# Patient Record
Sex: FEMALE | Race: WHITE | NOT HISPANIC OR LATINO | Employment: OTHER | URBAN - NONMETROPOLITAN AREA
[De-identification: names, ages, dates, MRNs, and addresses within clinical notes are randomized per-mention and may not be internally consistent; named-entity substitution may affect disease eponyms.]

---

## 2017-04-11 ENCOUNTER — OFFICE VISIT (OUTPATIENT)
Dept: NEUROSURGERY | Facility: CLINIC | Age: 67
End: 2017-04-11

## 2017-04-11 VITALS — HEIGHT: 64 IN | WEIGHT: 110 LBS | BODY MASS INDEX: 18.78 KG/M2

## 2017-04-11 DIAGNOSIS — M51.26 HERNIATION OF LUMBAR INTERVERTEBRAL DISC: Primary | ICD-10-CM

## 2017-04-11 PROCEDURE — 99203 OFFICE O/P NEW LOW 30 MIN: CPT | Performed by: NEUROLOGICAL SURGERY

## 2017-04-11 RX ORDER — ACETAMINOPHEN 500 MG
1000 TABLET ORAL EVERY 6 HOURS PRN
COMMUNITY
End: 2021-09-17 | Stop reason: HOSPADM

## 2017-04-11 RX ORDER — TRIAMTERENE AND HYDROCHLOROTHIAZIDE 37.5; 25 MG/1; MG/1
1 TABLET ORAL DAILY
COMMUNITY
End: 2021-09-17 | Stop reason: HOSPADM

## 2017-04-11 RX ORDER — OXYBUTYNIN CHLORIDE 5 MG/1
5 TABLET ORAL 3 TIMES DAILY
COMMUNITY

## 2017-04-11 RX ORDER — TRAMADOL HYDROCHLORIDE 50 MG/1
100 TABLET ORAL EVERY 6 HOURS PRN
Status: ON HOLD | COMMUNITY
End: 2021-09-17 | Stop reason: SDUPTHER

## 2017-04-11 RX ORDER — CITALOPRAM 20 MG/1
20 TABLET ORAL DAILY
COMMUNITY

## 2017-04-11 NOTE — PROGRESS NOTES
Subjective   Patient ID: Karyna Santiago is a 66 y.o. 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  It has become more constant in the past several months.  She has had intermittent back pain since age 15 when she was thrown from a horse.  Pain is aggravated by lifting, walking, driving, bending, or twisting.  This helped with both ice and heat.    Review of Radiographic Studies:   Lumbar MRI scan shows a herniated disc in the lateral recess at L2-3 on the left    The following portions of the patient's history were reviewed, updated as appropriate and approved: allergies, current medications, past family history, past medical history, past social history, past surgical history, review of systems and problem list.    Review of Systems   Constitutional: Positive for activity change, fatigue and unexpected weight change. Negative for appetite change, chills, diaphoresis and fever.   HENT: Positive for ear pain, postnasal drip, sinus pressure and sore throat. Negative for congestion, dental problem, drooling, ear discharge, facial swelling, hearing loss, mouth sores, nosebleeds, rhinorrhea, sneezing, tinnitus, trouble swallowing and voice change.    Eyes: Negative for photophobia, pain, discharge, redness, itching and visual disturbance.   Respiratory: Negative for apnea, cough, choking, chest tightness, shortness of breath, wheezing and stridor.    Cardiovascular: Positive for leg swelling. Negative for chest pain and palpitations.   Gastrointestinal: Positive for constipation and diarrhea. Negative for abdominal distention, abdominal pain, anal bleeding, blood in stool, nausea, rectal pain and vomiting.   Endocrine: Negative for cold intolerance, heat intolerance, polydipsia, polyphagia and polyuria.    Genitourinary: Positive for dyspareunia and urgency. Negative for decreased urine volume, difficulty urinating, dysuria, enuresis, flank pain, frequency, genital sores and hematuria.   Musculoskeletal: Positive for arthralgias, back pain, myalgias and neck pain. Negative for gait problem, joint swelling and neck stiffness.   Skin: Negative for color change, pallor, rash and wound.   Allergic/Immunologic: Positive for environmental allergies. Negative for food allergies and immunocompromised state.   Neurological: Negative for dizziness, tremors, seizures, syncope, facial asymmetry, speech difficulty, weakness, light-headedness, numbness and headaches.   Hematological: Negative for adenopathy. Bruises/bleeds easily.   Psychiatric/Behavioral: Positive for dysphoric mood. Negative for agitation, behavioral problems, confusion, decreased concentration, hallucinations, self-injury, sleep disturbance and suicidal ideas. The patient is not nervous/anxious and is not hyperactive.        Objective     NEUROLOGICAL EXAMINATION:      MENTAL STATUS:  Alert and oriented.  Speech intact.  Recent and remote memory intact.      CRANIAL NERVES:  Cranial nerve II:  Visual fields are full to confrontation.  Cranial nerves III, IV and VI:  PERRLADC.  Extraocular movements are intact.  Nystagmus is not present.  Cranial nerve V:  Facial sensation is intact to light touch.  Cranial nerve VII:  Muscles of facial expression reveal no asymmetry.  Cranial nerve VIII:  Hearing is intact to finger rub bilaterally.  Cranial nerves IX and X:  Palate elevates symmetrically.  Cranial nerve XI:  Shoulder shrug is intact.  Cranial nerve XII:  Tongue is midline without evidence of atrophy or fasciculation.    MUSCULOSKELETAL:  SLR negative. Salvador's test negative.    MOTOR:  Deltoid, biceps, triceps, and  strength intact.  No hand atrophy.  Hip flexion, knee extension, ankle dorsiflexion and plantar flexion intact. No tremor, spasticity,  ataxia, or dysmetria.    SENSATION:  Intact to touch upper and lower extremities.  Position sense intact.    REFLEXES:  DTR intact and symmetrical in upper and lower extremities.      Assessment   Lumbar disc herniation L2-3 left       Plan   Physical therapy, follow-up in 6 weeks.       Dany Latham MD

## 2021-09-14 ENCOUNTER — APPOINTMENT (OUTPATIENT)
Dept: MRI IMAGING | Facility: HOSPITAL | Age: 71
End: 2021-09-14

## 2021-09-14 ENCOUNTER — APPOINTMENT (OUTPATIENT)
Dept: CT IMAGING | Facility: HOSPITAL | Age: 71
End: 2021-09-14

## 2021-09-14 ENCOUNTER — HOSPITAL ENCOUNTER (INPATIENT)
Facility: HOSPITAL | Age: 71
LOS: 3 days | Discharge: NURSING, REHAB OR LONG-TERM CARE | End: 2021-09-17
Attending: FAMILY MEDICINE | Admitting: INTERNAL MEDICINE

## 2021-09-14 ENCOUNTER — APPOINTMENT (OUTPATIENT)
Dept: CARDIOLOGY | Facility: HOSPITAL | Age: 71
End: 2021-09-14

## 2021-09-14 DIAGNOSIS — R13.10 DYSPHAGIA, UNSPECIFIED TYPE: ICD-10-CM

## 2021-09-14 DIAGNOSIS — R47.1 DYSARTHRIA: ICD-10-CM

## 2021-09-14 DIAGNOSIS — R41.841 COGNITIVE COMMUNICATION DEFICIT: ICD-10-CM

## 2021-09-14 DIAGNOSIS — I63.9 CEREBROVASCULAR ACCIDENT (CVA), UNSPECIFIED MECHANISM (HCC): Primary | ICD-10-CM

## 2021-09-14 PROBLEM — E87.6 HYPOKALEMIA: Status: ACTIVE | Noted: 2021-09-14

## 2021-09-14 PROBLEM — Z72.0 TOBACCO ABUSE: Status: ACTIVE | Noted: 2021-09-14

## 2021-09-14 PROBLEM — I10 ESSENTIAL HYPERTENSION: Status: ACTIVE | Noted: 2021-09-14

## 2021-09-14 LAB
ALBUMIN SERPL-MCNC: 3 G/DL (ref 3.5–5.2)
ALBUMIN/GLOB SERPL: 1.3 G/DL
ALP SERPL-CCNC: 57 U/L (ref 39–117)
ALT SERPL W P-5'-P-CCNC: 7 U/L (ref 1–33)
ANION GAP SERPL CALCULATED.3IONS-SCNC: 9 MMOL/L (ref 5–15)
ASCENDING AORTA: 3.6 CM
AST SERPL-CCNC: 13 U/L (ref 1–32)
BACTERIA UR QL AUTO: ABNORMAL /HPF
BASOPHILS # BLD AUTO: 0.05 10*3/MM3 (ref 0–0.2)
BASOPHILS NFR BLD AUTO: 0.7 % (ref 0–1.5)
BH CV ECHO MEAS - AI DEC SLOPE: 148.3 CM/SEC^2
BH CV ECHO MEAS - AI MAX PG: 47 MMHG
BH CV ECHO MEAS - AI MAX VEL: 342.5 CM/SEC
BH CV ECHO MEAS - AI P1/2T: 676.3 MSEC
BH CV ECHO MEAS - AO MAX PG (FULL): 3.4 MMHG
BH CV ECHO MEAS - AO MAX PG: 8 MMHG
BH CV ECHO MEAS - AO MEAN PG (FULL): 1.4 MMHG
BH CV ECHO MEAS - AO MEAN PG: 3.7 MMHG
BH CV ECHO MEAS - AO ROOT AREA (BSA CORRECTED): 2
BH CV ECHO MEAS - AO ROOT AREA: 7.5 CM^2
BH CV ECHO MEAS - AO ROOT DIAM: 3.1 CM
BH CV ECHO MEAS - AO V2 MAX: 137 CM/SEC
BH CV ECHO MEAS - AO V2 MEAN: 87.9 CM/SEC
BH CV ECHO MEAS - AO V2 VTI: 26.5 CM
BH CV ECHO MEAS - ASC AORTA: 3.6 CM
BH CV ECHO MEAS - AVA(I,A): 2.5 CM^2
BH CV ECHO MEAS - AVA(I,D): 2.5 CM^2
BH CV ECHO MEAS - AVA(V,A): 2.4 CM^2
BH CV ECHO MEAS - AVA(V,D): 2.4 CM^2
BH CV ECHO MEAS - BSA(HAYCOCK): 1.6 M^2
BH CV ECHO MEAS - BSA: 1.5 M^2
BH CV ECHO MEAS - BZI_BMI: 22.1 KILOGRAMS/M^2
BH CV ECHO MEAS - BZI_METRIC_HEIGHT: 157.5 CM
BH CV ECHO MEAS - BZI_METRIC_WEIGHT: 54.9 KG
BH CV ECHO MEAS - EDV(CUBED): 80.5 ML
BH CV ECHO MEAS - EDV(MOD-SP2): 55.4 ML
BH CV ECHO MEAS - EDV(MOD-SP4): 65.7 ML
BH CV ECHO MEAS - EDV(TEICH): 83.9 ML
BH CV ECHO MEAS - EF(CUBED): 69.3 %
BH CV ECHO MEAS - EF(MOD-BP): 62 %
BH CV ECHO MEAS - EF(MOD-SP2): 63.4 %
BH CV ECHO MEAS - EF(MOD-SP4): 60.1 %
BH CV ECHO MEAS - EF(TEICH): 61.2 %
BH CV ECHO MEAS - ESV(CUBED): 24.7 ML
BH CV ECHO MEAS - ESV(MOD-SP2): 20.3 ML
BH CV ECHO MEAS - ESV(MOD-SP4): 26.2 ML
BH CV ECHO MEAS - ESV(TEICH): 32.5 ML
BH CV ECHO MEAS - FS: 32.6 %
BH CV ECHO MEAS - IVS/LVPW: 1.1
BH CV ECHO MEAS - IVSD: 0.94 CM
BH CV ECHO MEAS - LA DIMENSION: 3.4 CM
BH CV ECHO MEAS - LA/AO: 1.1
BH CV ECHO MEAS - LAD MAJOR: 4.2 CM
BH CV ECHO MEAS - LAT PEAK E' VEL: 7.9 CM/SEC
BH CV ECHO MEAS - LATERAL E/E' RATIO: 8
BH CV ECHO MEAS - LV DIASTOLIC VOL/BSA (35-75): 42.6 ML/M^2
BH CV ECHO MEAS - LV IVRT: 0.1 SEC
BH CV ECHO MEAS - LV MASS(C)D: 124.8 GRAMS
BH CV ECHO MEAS - LV MASS(C)DI: 80.8 GRAMS/M^2
BH CV ECHO MEAS - LV MAX PG: 4.6 MMHG
BH CV ECHO MEAS - LV MEAN PG: 2.2 MMHG
BH CV ECHO MEAS - LV SYSTOLIC VOL/BSA (12-30): 17 ML/M^2
BH CV ECHO MEAS - LV V1 MAX: 107.7 CM/SEC
BH CV ECHO MEAS - LV V1 MEAN: 67.8 CM/SEC
BH CV ECHO MEAS - LV V1 VTI: 21 CM
BH CV ECHO MEAS - LVIDD: 4.3 CM
BH CV ECHO MEAS - LVIDS: 2.9 CM
BH CV ECHO MEAS - LVLD AP2: 6.6 CM
BH CV ECHO MEAS - LVLD AP4: 6.8 CM
BH CV ECHO MEAS - LVLS AP2: 5.4 CM
BH CV ECHO MEAS - LVLS AP4: 5.5 CM
BH CV ECHO MEAS - LVOT AREA (M): 3.1 CM^2
BH CV ECHO MEAS - LVOT AREA: 3.1 CM^2
BH CV ECHO MEAS - LVOT DIAM: 2 CM
BH CV ECHO MEAS - LVPWD: 0.86 CM
BH CV ECHO MEAS - MED PEAK E' VEL: 7 CM/SEC
BH CV ECHO MEAS - MEDIAL E/E' RATIO: 9.1
BH CV ECHO MEAS - MV A MAX VEL: 87.8 CM/SEC
BH CV ECHO MEAS - MV DEC SLOPE: 372 CM/SEC^2
BH CV ECHO MEAS - MV DEC TIME: 0.21 SEC
BH CV ECHO MEAS - MV E MAX VEL: 63.4 CM/SEC
BH CV ECHO MEAS - MV E/A: 0.72
BH CV ECHO MEAS - MV MAX PG: 3.3 MMHG
BH CV ECHO MEAS - MV MEAN PG: 1.1 MMHG
BH CV ECHO MEAS - MV P1/2T MAX VEL: 73.8 CM/SEC
BH CV ECHO MEAS - MV P1/2T: 58.1 MSEC
BH CV ECHO MEAS - MV V2 MAX: 90.2 CM/SEC
BH CV ECHO MEAS - MV V2 MEAN: 46 CM/SEC
BH CV ECHO MEAS - MV V2 VTI: 24.1 CM
BH CV ECHO MEAS - MVA P1/2T LCG: 3 CM^2
BH CV ECHO MEAS - MVA(P1/2T): 3.8 CM^2
BH CV ECHO MEAS - MVA(VTI): 2.7 CM^2
BH CV ECHO MEAS - PA ACC TIME: 0.16 SEC
BH CV ECHO MEAS - PA MAX PG: 3.7 MMHG
BH CV ECHO MEAS - PA PR(ACCEL): 6.1 MMHG
BH CV ECHO MEAS - PA V2 MAX: 96.6 CM/SEC
BH CV ECHO MEAS - RAP SYSTOLE: 8 MMHG
BH CV ECHO MEAS - RVSP: 40 MMHG
BH CV ECHO MEAS - SI(AO): 128.8 ML/M^2
BH CV ECHO MEAS - SI(CUBED): 36.2 ML/M^2
BH CV ECHO MEAS - SI(LVOT): 42.3 ML/M^2
BH CV ECHO MEAS - SI(MOD-SP2): 22.7 ML/M^2
BH CV ECHO MEAS - SI(MOD-SP4): 25.6 ML/M^2
BH CV ECHO MEAS - SI(TEICH): 33.3 ML/M^2
BH CV ECHO MEAS - SV(AO): 198.8 ML
BH CV ECHO MEAS - SV(CUBED): 55.8 ML
BH CV ECHO MEAS - SV(LVOT): 65.2 ML
BH CV ECHO MEAS - SV(MOD-SP2): 35.1 ML
BH CV ECHO MEAS - SV(MOD-SP4): 39.5 ML
BH CV ECHO MEAS - SV(TEICH): 51.4 ML
BH CV ECHO MEAS - TAPSE (>1.6): 2 CM
BH CV ECHO MEAS - TR MAX PG: 32 MMHG
BH CV ECHO MEAS - TR MAX VEL: 282 CM/SEC
BH CV ECHO MEASUREMENTS AVERAGE E/E' RATIO: 8.51
BH CV VAS BP LEFT ARM: NORMAL MMHG
BH CV XLRA - RV BASE: 3.5 CM
BH CV XLRA - RV LENGTH: 5.7 CM
BH CV XLRA - RV MID: 2.4 CM
BH CV XLRA - TDI S': 18.2 CM/SEC
BILIRUB SERPL-MCNC: 0.6 MG/DL (ref 0–1.2)
BILIRUB UR QL STRIP: ABNORMAL
BUN SERPL-MCNC: 13 MG/DL (ref 8–23)
BUN/CREAT SERPL: 21 (ref 7–25)
CALCIUM SPEC-SCNC: 8.6 MG/DL (ref 8.6–10.5)
CHLORIDE SERPL-SCNC: 101 MMOL/L (ref 98–107)
CHOLEST SERPL-MCNC: 134 MG/DL (ref 0–200)
CLARITY UR: CLEAR
CO2 SERPL-SCNC: 27 MMOL/L (ref 22–29)
COLOR UR: ABNORMAL
CREAT SERPL-MCNC: 0.62 MG/DL (ref 0.57–1)
DEPRECATED RDW RBC AUTO: 50.9 FL (ref 37–54)
EOSINOPHIL # BLD AUTO: 0.06 10*3/MM3 (ref 0–0.4)
EOSINOPHIL NFR BLD AUTO: 0.9 % (ref 0.3–6.2)
ERYTHROCYTE [DISTWIDTH] IN BLOOD BY AUTOMATED COUNT: 14.7 % (ref 12.3–15.4)
FOLATE SERPL-MCNC: >20 NG/ML (ref 4.78–24.2)
GFR SERPL CREATININE-BSD FRML MDRD: 95 ML/MIN/1.73
GLOBULIN UR ELPH-MCNC: 2.3 GM/DL
GLUCOSE BLDC GLUCOMTR-MCNC: 114 MG/DL (ref 70–130)
GLUCOSE BLDC GLUCOMTR-MCNC: 82 MG/DL (ref 70–130)
GLUCOSE SERPL-MCNC: 98 MG/DL (ref 65–99)
GLUCOSE UR STRIP-MCNC: NEGATIVE MG/DL
HBA1C MFR BLD: 5.4 % (ref 4.8–5.6)
HCT VFR BLD AUTO: 44.3 % (ref 34–46.6)
HDLC SERPL-MCNC: 63 MG/DL (ref 40–60)
HGB BLD-MCNC: 13.7 G/DL (ref 12–15.9)
HGB UR QL STRIP.AUTO: NEGATIVE
HYALINE CASTS UR QL AUTO: ABNORMAL /LPF
IMM GRANULOCYTES # BLD AUTO: 0.02 10*3/MM3 (ref 0–0.05)
IMM GRANULOCYTES NFR BLD AUTO: 0.3 % (ref 0–0.5)
KETONES UR QL STRIP: NEGATIVE
LDLC SERPL CALC-MCNC: 59 MG/DL (ref 0–100)
LDLC/HDLC SERPL: 0.94 {RATIO}
LEFT ATRIUM VOLUME INDEX: 22.7 ML/M^2
LEFT ATRIUM VOLUME: 35 ML
LEUKOCYTE ESTERASE UR QL STRIP.AUTO: NEGATIVE
LYMPHOCYTES # BLD AUTO: 1.23 10*3/MM3 (ref 0.7–3.1)
LYMPHOCYTES NFR BLD AUTO: 17.9 % (ref 19.6–45.3)
MCH RBC QN AUTO: 29.4 PG (ref 26.6–33)
MCHC RBC AUTO-ENTMCNC: 30.9 G/DL (ref 31.5–35.7)
MCV RBC AUTO: 95.1 FL (ref 79–97)
MONOCYTES # BLD AUTO: 0.66 10*3/MM3 (ref 0.1–0.9)
MONOCYTES NFR BLD AUTO: 9.6 % (ref 5–12)
NEUTROPHILS NFR BLD AUTO: 4.85 10*3/MM3 (ref 1.7–7)
NEUTROPHILS NFR BLD AUTO: 70.6 % (ref 42.7–76)
NITRITE UR QL STRIP: POSITIVE
NRBC BLD AUTO-RTO: 0 /100 WBC (ref 0–0.2)
PH UR STRIP.AUTO: 5.5 [PH] (ref 5–8)
PLATELET # BLD AUTO: 233 10*3/MM3 (ref 140–450)
PMV BLD AUTO: 9.9 FL (ref 6–12)
POTASSIUM SERPL-SCNC: 3.1 MMOL/L (ref 3.5–5.2)
PROT SERPL-MCNC: 5.3 G/DL (ref 6–8.5)
PROT UR QL STRIP: NEGATIVE
RBC # BLD AUTO: 4.66 10*6/MM3 (ref 3.77–5.28)
RBC # UR: ABNORMAL /HPF
REF LAB TEST METHOD: ABNORMAL
SARS-COV-2 RNA RESP QL NAA+PROBE: NOT DETECTED
SODIUM SERPL-SCNC: 137 MMOL/L (ref 136–145)
SP GR UR STRIP: 1.06 (ref 1–1.03)
SQUAMOUS #/AREA URNS HPF: ABNORMAL /HPF
TRIGL SERPL-MCNC: 58 MG/DL (ref 0–150)
TSH SERPL DL<=0.05 MIU/L-ACNC: 0.57 UIU/ML (ref 0.27–4.2)
UROBILINOGEN UR QL STRIP: ABNORMAL
VIT B12 BLD-MCNC: 1160 PG/ML (ref 211–946)
VLDLC SERPL-MCNC: 12 MG/DL (ref 5–40)
WBC # BLD AUTO: 6.87 10*3/MM3 (ref 3.4–10.8)
WBC UR QL AUTO: ABNORMAL /HPF

## 2021-09-14 PROCEDURE — 70450 CT HEAD/BRAIN W/O DYE: CPT

## 2021-09-14 PROCEDURE — 82746 ASSAY OF FOLIC ACID SERUM: CPT | Performed by: INTERNAL MEDICINE

## 2021-09-14 PROCEDURE — 85025 COMPLETE CBC W/AUTO DIFF WBC: CPT | Performed by: INTERNAL MEDICINE

## 2021-09-14 PROCEDURE — 84425 ASSAY OF VITAMIN B-1: CPT | Performed by: PSYCHIATRY & NEUROLOGY

## 2021-09-14 PROCEDURE — 80053 COMPREHEN METABOLIC PANEL: CPT | Performed by: INTERNAL MEDICINE

## 2021-09-14 PROCEDURE — 93306 TTE W/DOPPLER COMPLETE: CPT

## 2021-09-14 PROCEDURE — 82607 VITAMIN B-12: CPT | Performed by: INTERNAL MEDICINE

## 2021-09-14 PROCEDURE — 84443 ASSAY THYROID STIM HORMONE: CPT | Performed by: INTERNAL MEDICINE

## 2021-09-14 PROCEDURE — 93306 TTE W/DOPPLER COMPLETE: CPT | Performed by: INTERNAL MEDICINE

## 2021-09-14 PROCEDURE — 70498 CT ANGIOGRAPHY NECK: CPT

## 2021-09-14 PROCEDURE — 70496 CT ANGIOGRAPHY HEAD: CPT

## 2021-09-14 PROCEDURE — 87635 SARS-COV-2 COVID-19 AMP PRB: CPT | Performed by: INTERNAL MEDICINE

## 2021-09-14 PROCEDURE — 25010000002 HEPARIN (PORCINE) PER 1000 UNITS: Performed by: INTERNAL MEDICINE

## 2021-09-14 PROCEDURE — 99223 1ST HOSP IP/OBS HIGH 75: CPT | Performed by: INTERNAL MEDICINE

## 2021-09-14 PROCEDURE — 84132 ASSAY OF SERUM POTASSIUM: CPT | Performed by: INTERNAL MEDICINE

## 2021-09-14 PROCEDURE — 80061 LIPID PANEL: CPT | Performed by: INTERNAL MEDICINE

## 2021-09-14 PROCEDURE — 0042T HC CT CEREBRAL PERFUSION W/WO CONTRAST: CPT

## 2021-09-14 PROCEDURE — 92523 SPEECH SOUND LANG COMPREHEN: CPT

## 2021-09-14 PROCEDURE — 70551 MRI BRAIN STEM W/O DYE: CPT

## 2021-09-14 PROCEDURE — 83036 HEMOGLOBIN GLYCOSYLATED A1C: CPT | Performed by: INTERNAL MEDICINE

## 2021-09-14 PROCEDURE — 4A03X5D MEASUREMENT OF ARTERIAL FLOW, INTRACRANIAL, EXTERNAL APPROACH: ICD-10-PCS | Performed by: RADIOLOGY

## 2021-09-14 PROCEDURE — 82962 GLUCOSE BLOOD TEST: CPT

## 2021-09-14 PROCEDURE — 81001 URINALYSIS AUTO W/SCOPE: CPT | Performed by: INTERNAL MEDICINE

## 2021-09-14 PROCEDURE — 92610 EVALUATE SWALLOWING FUNCTION: CPT

## 2021-09-14 PROCEDURE — 0 IOPAMIDOL PER 1 ML: Performed by: INTERNAL MEDICINE

## 2021-09-14 PROCEDURE — 99222 1ST HOSP IP/OBS MODERATE 55: CPT | Performed by: PSYCHIATRY & NEUROLOGY

## 2021-09-14 RX ORDER — ACETAMINOPHEN 325 MG/1
650 TABLET ORAL EVERY 4 HOURS PRN
Status: DISCONTINUED | OUTPATIENT
Start: 2021-09-14 | End: 2021-09-17 | Stop reason: HOSPADM

## 2021-09-14 RX ORDER — SODIUM CHLORIDE 0.9 % (FLUSH) 0.9 %
10 SYRINGE (ML) INJECTION EVERY 12 HOURS SCHEDULED
Status: DISCONTINUED | OUTPATIENT
Start: 2021-09-14 | End: 2021-09-17 | Stop reason: HOSPADM

## 2021-09-14 RX ORDER — NICOTINE 21 MG/24HR
1 PATCH, TRANSDERMAL 24 HOURS TRANSDERMAL
Status: DISCONTINUED | OUTPATIENT
Start: 2021-09-14 | End: 2021-09-17 | Stop reason: HOSPADM

## 2021-09-14 RX ORDER — SODIUM CHLORIDE 0.9 % (FLUSH) 0.9 %
10 SYRINGE (ML) INJECTION AS NEEDED
Status: DISCONTINUED | OUTPATIENT
Start: 2021-09-14 | End: 2021-09-17 | Stop reason: HOSPADM

## 2021-09-14 RX ORDER — CITALOPRAM 20 MG/1
20 TABLET ORAL DAILY
Status: DISCONTINUED | OUTPATIENT
Start: 2021-09-14 | End: 2021-09-17 | Stop reason: HOSPADM

## 2021-09-14 RX ORDER — HEPARIN SODIUM 5000 [USP'U]/ML
5000 INJECTION, SOLUTION INTRAVENOUS; SUBCUTANEOUS EVERY 8 HOURS SCHEDULED
Status: DISCONTINUED | OUTPATIENT
Start: 2021-09-14 | End: 2021-09-15

## 2021-09-14 RX ORDER — POTASSIUM CHLORIDE 750 MG/1
40 CAPSULE, EXTENDED RELEASE ORAL AS NEEDED
Status: DISCONTINUED | OUTPATIENT
Start: 2021-09-14 | End: 2021-09-17 | Stop reason: HOSPADM

## 2021-09-14 RX ORDER — ACETAMINOPHEN 160 MG/5ML
650 SOLUTION ORAL EVERY 4 HOURS PRN
Status: DISCONTINUED | OUTPATIENT
Start: 2021-09-14 | End: 2021-09-17 | Stop reason: HOSPADM

## 2021-09-14 RX ORDER — ONDANSETRON 4 MG/1
4 TABLET, FILM COATED ORAL EVERY 6 HOURS PRN
Status: DISCONTINUED | OUTPATIENT
Start: 2021-09-14 | End: 2021-09-17 | Stop reason: HOSPADM

## 2021-09-14 RX ORDER — ASPIRIN 81 MG/1
81 TABLET, CHEWABLE ORAL DAILY
Status: DISCONTINUED | OUTPATIENT
Start: 2021-09-14 | End: 2021-09-17 | Stop reason: HOSPADM

## 2021-09-14 RX ORDER — OXYBUTYNIN CHLORIDE 5 MG/1
5 TABLET ORAL 3 TIMES DAILY
Status: DISCONTINUED | OUTPATIENT
Start: 2021-09-14 | End: 2021-09-17 | Stop reason: HOSPADM

## 2021-09-14 RX ORDER — TRAMADOL HYDROCHLORIDE 50 MG/1
100 TABLET ORAL EVERY 6 HOURS PRN
Status: DISCONTINUED | OUTPATIENT
Start: 2021-09-14 | End: 2021-09-17 | Stop reason: HOSPADM

## 2021-09-14 RX ORDER — ONDANSETRON 2 MG/ML
4 INJECTION INTRAMUSCULAR; INTRAVENOUS EVERY 6 HOURS PRN
Status: DISCONTINUED | OUTPATIENT
Start: 2021-09-14 | End: 2021-09-17 | Stop reason: HOSPADM

## 2021-09-14 RX ORDER — ATORVASTATIN CALCIUM 40 MG/1
80 TABLET, FILM COATED ORAL NIGHTLY
Status: DISCONTINUED | OUTPATIENT
Start: 2021-09-14 | End: 2021-09-17 | Stop reason: HOSPADM

## 2021-09-14 RX ORDER — CLOPIDOGREL BISULFATE 75 MG/1
75 TABLET ORAL DAILY
Status: DISCONTINUED | OUTPATIENT
Start: 2021-09-14 | End: 2021-09-15

## 2021-09-14 RX ORDER — POTASSIUM CHLORIDE 7.45 MG/ML
10 INJECTION INTRAVENOUS
Status: DISCONTINUED | OUTPATIENT
Start: 2021-09-14 | End: 2021-09-17 | Stop reason: HOSPADM

## 2021-09-14 RX ORDER — ACETAMINOPHEN 650 MG/1
650 SUPPOSITORY RECTAL EVERY 4 HOURS PRN
Status: DISCONTINUED | OUTPATIENT
Start: 2021-09-14 | End: 2021-09-17 | Stop reason: HOSPADM

## 2021-09-14 RX ORDER — POTASSIUM CHLORIDE 1.5 G/1.77G
40 POWDER, FOR SOLUTION ORAL AS NEEDED
Status: DISCONTINUED | OUTPATIENT
Start: 2021-09-14 | End: 2021-09-17 | Stop reason: HOSPADM

## 2021-09-14 RX ORDER — SODIUM CHLORIDE 0.9 % (FLUSH) 0.9 %
1-10 SYRINGE (ML) INJECTION AS NEEDED
Status: DISCONTINUED | OUTPATIENT
Start: 2021-09-14 | End: 2021-09-17 | Stop reason: HOSPADM

## 2021-09-14 RX ORDER — OMEPRAZOLE 20 MG/1
20 CAPSULE, DELAYED RELEASE ORAL DAILY
COMMUNITY

## 2021-09-14 RX ORDER — ASPIRIN 300 MG/1
300 SUPPOSITORY RECTAL DAILY
Status: DISCONTINUED | OUTPATIENT
Start: 2021-09-14 | End: 2021-09-17 | Stop reason: HOSPADM

## 2021-09-14 RX ADMIN — IOPAMIDOL 100 ML: 755 INJECTION, SOLUTION INTRAVENOUS at 08:31

## 2021-09-14 RX ADMIN — ATORVASTATIN CALCIUM 80 MG: 40 TABLET, FILM COATED ORAL at 21:23

## 2021-09-14 RX ADMIN — CITALOPRAM HYDROBROMIDE 20 MG: 20 TABLET ORAL at 11:11

## 2021-09-14 RX ADMIN — POTASSIUM CHLORIDE 40 MEQ: 750 CAPSULE, EXTENDED RELEASE ORAL at 17:07

## 2021-09-14 RX ADMIN — POTASSIUM CHLORIDE 40 MEQ: 750 CAPSULE, EXTENDED RELEASE ORAL at 21:23

## 2021-09-14 RX ADMIN — SODIUM CHLORIDE, PRESERVATIVE FREE 10 ML: 5 INJECTION INTRAVENOUS at 21:23

## 2021-09-14 RX ADMIN — TRAMADOL HYDROCHLORIDE 100 MG: 50 TABLET, FILM COATED ORAL at 11:10

## 2021-09-14 RX ADMIN — OXYBUTYNIN CHLORIDE 5 MG: 5 TABLET ORAL at 11:11

## 2021-09-14 RX ADMIN — ASPIRIN 81 MG CHEWABLE TABLET 81 MG: 81 TABLET CHEWABLE at 11:10

## 2021-09-14 RX ADMIN — TRAMADOL HYDROCHLORIDE 100 MG: 50 TABLET, FILM COATED ORAL at 18:46

## 2021-09-14 RX ADMIN — OXYBUTYNIN CHLORIDE 5 MG: 5 TABLET ORAL at 21:23

## 2021-09-14 RX ADMIN — POTASSIUM CHLORIDE 40 MEQ: 750 CAPSULE, EXTENDED RELEASE ORAL at 11:11

## 2021-09-14 RX ADMIN — CLOPIDOGREL BISULFATE 75 MG: 75 TABLET ORAL at 11:10

## 2021-09-14 RX ADMIN — HEPARIN SODIUM 5000 UNITS: 5000 INJECTION, SOLUTION INTRAVENOUS; SUBCUTANEOUS at 21:26

## 2021-09-14 RX ADMIN — OXYBUTYNIN CHLORIDE 5 MG: 5 TABLET ORAL at 17:07

## 2021-09-14 RX ADMIN — Medication 1 PATCH: at 11:10

## 2021-09-14 RX ADMIN — SODIUM CHLORIDE, PRESERVATIVE FREE 10 ML: 5 INJECTION INTRAVENOUS at 11:10

## 2021-09-14 NOTE — H&P
[facility] Medicine Services  HISTORY AND PHYSICAL    Patient Name: Karyna Santiago  : 1950  MRN: [id number]  Primary Care Physician: Mason Miranda MD  Date of admission: 2021      Subjective   Subjective     Chief Complaint:  CVA    HPI:  Karyna Santiago is a 71 y.o. female fully vaccinated for COVID with PMH of HTN, osteoarthritis, gastric bypass, recent multiple rib fractures due to injury from a calf with incidental COVID19 positive test during that admission (at  in May 2021) and ongoing tobacco abuse presented to [facility] around 4am with left sided weakness. Pt was last known well around 9pm when she fell asleep in her truck. She awoke at 330am with bilateral upper extremity paresthesias which have since only started to affect her LUE. She has also noted left upper extremity weakness.  She denies any h/o CVA but did note recent left arm numbness in May this year that went away on its own.          COVID Details:    Symptoms:    [x] NONE [] Fever []  Cough [] Shortness of breath [] Change in taste/smell      The patient has a COVID HM Topic on their chart, and they are fully vaccinated.      Review of Systems   General- no F/C, no weight loss or gain, no fatigue  HEENT- no blurry vision, no nasal congestion, no hearing loss, no sore throat, no dysphagia, no oral ulcers, no dental caries, no bleeding gums  CVS- no chest pain, no palpitations  Pulm- no SOA, no cough, no orthopnea, no PND  GI- no diarrhea, no constipation, no BRBPR, no nausea, no vomiting  MSK- no joint pain, no swelling, no erythema  - no dysuria, no hematuria  Neuro- no headaches as above  Psych- no SI/ HI, no depression/anxiety  All other systems reviewed and are negative.     Personal History     Past Medical History:   Diagnosis Date   • Anemia    • Arthritis    • Blood disease    • Headache    • History of transfusion    • Hypertension        Past Surgical History:   Procedure Laterality Date   •  GASTRIC BYPASS         Family History:  family history includes Stroke in her father. Otherwise pertinent FHx was reviewed and unremarkable.     Social History:  reports that she has been smoking. She does not have any smokeless tobacco history on file. She has a 52 pack year history.  She lives with her .       Medications:  Available home medication information reviewed.  Medications Prior to Admission   Medication Sig Dispense Refill Last Dose   • acetaminophen (TYLENOL) 500 MG tablet Take 1,000 mg by mouth Every 6 (Six) Hours As Needed for Mild Pain (1-3).      • citalopram (CeleXA) 20 MG tablet Take 20 mg by mouth Daily.      • Cyanocobalamin (B-12 COMPLIANCE INJECTION IJ) Inject  as directed.      • estrogens conjugated, synthetic A, (CENESTIN) 1.25 MG tablet Take 1.25 mg by mouth Daily.      • oxybutynin (DITROPAN) 5 MG tablet Take 5 mg by mouth 3 (Three) Times a Day.      • traMADol (ULTRAM) 50 MG tablet Take 100 mg by mouth Every 6 (Six) Hours As Needed for Moderate Pain (4-6).      • triamterene-hydrochlorothiazide (MAXZIDE-25) 37.5-25 MG per tablet Take 1 tablet by mouth Daily.          Allergies   Allergen Reactions   • Clindamycin/Lincomycin    • Nsaids        Objective   Objective     Vital Signs:   Temp:  [98.3 °F (36.8 °C)] 98.3 °F (36.8 °C)  Heart Rate:  [80] 80  Resp:  [18] 18  BP: (129)/(82) 129/82       Physical Exam   Constitutional: Awake, alert  Eyes: PERRLA, sclerae anicteric, no conjunctival injection  HENT: NCAT, mucous membranes moist, poor dentition  Neck: Supple, no thyromegaly, no lymphadenopathy, trachea midline  Respiratory: Clear to auscultation bilaterally, nonlabored respirations   Cardiovascular: RRR, no murmurs, rubs, or gallops, palpable pedal pulses bilaterally  Gastrointestinal: Positive bowel sounds, soft, nontender, nondistended  Musculoskeletal: No bilateral ankle edema, no clubbing or cyanosis to extremities  Psychiatric: Appropriate affect, cooperative  Neurologic:  Oriented x 3, left upper extremity 2/5 strength and decreased range of motion, Cranial Nerves grossly intact to confrontation, speech clear  Skin: No rashes, soles of feet soiled as are her fingernails      Result Review:  I have personally reviewed the results from the time of this admission to 9/14/2021 10:07 EDT and agree with these findings:  [x]  Laboratory  []  Microbiology  [x]  Radiology  []  EKG/Telemetry   []  Cardiology/Vascular   []  Pathology  [x]  Old records  []  Other:  Most notable findings include: old CVA noted on CT head. Hypokalemia  LAB RESULTS:      Lab 09/14/21  0833   WBC 6.87   HEMOGLOBIN 13.7   HEMATOCRIT 44.3   PLATELETS 233   NEUTROS ABS 4.85   IMMATURE GRANS (ABS) 0.02   LYMPHS ABS 1.23   MONOS ABS 0.66   EOS ABS 0.06   MCV 95.1         Lab 09/14/21  0918   SODIUM 137   POTASSIUM 3.1*   CHLORIDE 101   CO2 27.0   ANION GAP 9.0   BUN 13   CREATININE 0.62   GLUCOSE 98   CALCIUM 8.6   TSH 0.567         Lab 09/14/21  0918   TOTAL PROTEIN 5.3*   ALBUMIN 3.00*   GLOBULIN 2.3   ALT (SGPT) 7   AST (SGOT) 13   BILIRUBIN 0.6   ALK PHOS 57             Lab 09/14/21  0918   CHOLESTEROL 134   LDL CHOL 59   HDL CHOL 63*   TRIGLYCERIDES 58                 Microbiology Results (last 10 days)     ** No results found for the last 240 hours. **          CT Head Without Contrast    Result Date: 9/14/2021  EXAMINATION: CT HEAD WO CONTRAST-09/14/2021:  INDICATION: Neuro deficit, acute, stroke suspected.  TECHNIQUE: CT head without intravenous contrast.  The radiation dose reduction device was turned on for each scan per the ALARA (As Low as Reasonably Achievable) protocol.  COMPARISON: NONE.  FINDINGS: The midline structures are symmetric without evidence of mass, mass effect or midline shift with only a small area of asymmetry in the left parietal region likely chronic small vessel ischemic disease versus prior insult with background moderate chronic small vessel ischemic disease. No intra-axial hemorrhage or  extra-axial fluid collection. Globes and orbits are unremarkable. The paranasal sinuses and mastoid air cells are grossly clear and well pneumatized. Calvarium intact.      Impression: No acute intracranial findings with at least moderately advanced senescent changes of chronic small vessel ischemic disease noted bilateral supratentorial structures. No intra-axial hemorrhage or extra-axial fluid collection.  Scan performed 09/14/2021 at 0802 hours. Scan report made available on 09/14/2021 at 0842 hours.  D:  09/14/2021 E:  09/14/2021        CT Angiogram Neck    Result Date: 9/14/2021  EXAMINATION: CT ANGIOGRAM NECK-, CT ANGIOGRAM HEAD W AI ANALYSIS OF LVO-09/14/2021:  INDICATION: Stroke, follow-up.  TECHNIQUE: CT angiogram head and neck with and without intravenous contrast administration. 2-D and 3-D reconstructions performed.  The radiation dose reduction device was turned on for each scan per the ALARA (As Low as Reasonably Achievable) protocol.  COMPARISON: NONE.  FINDINGS:  CTA NECK: Normal 3-vessel arch with patent great vessel origins despite minimal calcific disease greatest in the left subclavian takeoff without significant stenosis. Proximal subclavian arteries are patent. The vertebral arteries demonstrate grossly symmetric caliber without focal severe stenosis, aneurysm or occlusion. The carotids demonstrate grossly normal course and branching pattern with minimal plaque formation including calcific disease producing 10% right and 15% left luminal narrowing as measured by NASCET criteria with patency of the distal internal carotid arteries through the intracranial portions discussed further below. Cervical soft tissue is unremarkable without bulky cervical adenopathy. The thyroid is homogeneous. The lung apices demonstrate emphysematous involvement of centrilobular emphysema.  CTA HEAD: Distal internal carotid arteries without focal severe stenosis, aneurysm or occlusion. Anterior cerebral arteries are  patent without hemodynamically significant stenosis, aneurysm or occlusion. Middle cerebral arteries are patent without hemodynamically significant stenosis, aneurysm or occlusion. Vertebrobasilar system and posterior cerebral arteries are patent without hemodynamically significant stenosis, aneurysm or occlusion. Venous structures grossly unremarkable on limited evaluation with superior sagittal sinus widely patent.      Impression: Apart from minimal atherosclerotic calcific disease normal CTA head and neck without hemodynamically significant stenosis, aneurysm or occlusion.  D:  09/14/2021 E:  09/14/2021        CT Angiogram Head w AI Analysis of LVO    Result Date: 9/14/2021  EXAMINATION: CT ANGIOGRAM NECK-, CT ANGIOGRAM HEAD W AI ANALYSIS OF LVO-09/14/2021:  INDICATION: Stroke, follow-up.  TECHNIQUE: CT angiogram head and neck with and without intravenous contrast administration. 2-D and 3-D reconstructions performed.  The radiation dose reduction device was turned on for each scan per the ALARA (As Low as Reasonably Achievable) protocol.  COMPARISON: NONE.  FINDINGS:  CTA NECK: Normal 3-vessel arch with patent great vessel origins despite minimal calcific disease greatest in the left subclavian takeoff without significant stenosis. Proximal subclavian arteries are patent. The vertebral arteries demonstrate grossly symmetric caliber without focal severe stenosis, aneurysm or occlusion. The carotids demonstrate grossly normal course and branching pattern with minimal plaque formation including calcific disease producing 10% right and 15% left luminal narrowing as measured by NASCET criteria with patency of the distal internal carotid arteries through the intracranial portions discussed further below. Cervical soft tissue is unremarkable without bulky cervical adenopathy. The thyroid is homogeneous. The lung apices demonstrate emphysematous involvement of centrilobular emphysema.  CTA HEAD: Distal internal carotid  arteries without focal severe stenosis, aneurysm or occlusion. Anterior cerebral arteries are patent without hemodynamically significant stenosis, aneurysm or occlusion. Middle cerebral arteries are patent without hemodynamically significant stenosis, aneurysm or occlusion. Vertebrobasilar system and posterior cerebral arteries are patent without hemodynamically significant stenosis, aneurysm or occlusion. Venous structures grossly unremarkable on limited evaluation with superior sagittal sinus widely patent.      Impression: Apart from minimal atherosclerotic calcific disease normal CTA head and neck without hemodynamically significant stenosis, aneurysm or occlusion.  D:  09/14/2021 E:  09/14/2021        CT CEREBRAL PERFUSION WITH & WITHOUT CONTRAST    Result Date: 9/14/2021  EXAMINATION: CT CEREBRAL PERFUSION W WO CONTRAST-  INDICATION: Neuro deficit, acute, stroke suspected.  TECHNIQUE: CT cerebral perfusion with and without intravenous contrast administration. Multiple parametric maps including mean transit time, time to drain, cerebral blood flow and cerebral blood volume performed.  The radiation dose reduction device was turned on for each scan per the ALARA (As Low as Reasonably Achievable) protocol.  COMPARISON: None.  FINDINGS: Symmetric correlating parametric maps without perfusion defect identified specifically no reversible ischemia within a specific vascular territory.      Impression: No reversible ischemia within a specific vascular territory.  D:  09/14/2021 E:  09/14/2021             Assessment/Plan   Assessment & Plan     Active Hospital Problems    Diagnosis  POA   • Stroke (CMS/HCC) [I63.9]  Yes     Priority: High   • Essential hypertension [I10]  Unknown     Priority: Low   • Tobacco abuse [Z72.0]  Unknown     Priority: Low   • Hypokalemia [E87.6]  Unknown     Priority: Low       Ms. Santiago is a 70 yo WF fully vaccinated for COVID with PMH of HTN, osteoarthritis, gastric bypass, recent  multiple rib fractures due to injury from a calf with incidental COVID19 positive test during that admission (at  in May 2021) and ongoing tobacco abuse presented to [facility] around 4am with left sided weakness.    Plan:    Left sided weakness  -- r/o CVA  -- MRI PENDING  -- imaging as noted above  -- Stroke navigator has already seen  -- consult Stroke Neuro  -- permissive HTN  -- FLP, HbA1C PENDING  -- TTE PENDING  -- high potency statin, ASA    HTN  --permissive HTN for now, hold home meds    Hypokalemia  --replace per protocol    Tobacco abuse  --counseled re: cessation  -- nicotine patch while inpatient    Osteoarthritis  --continue home dose Tramadol and PRN Tylenol        DVT prophylaxis:  MARIAA      CODE STATUS:  Full code  Code Status and Medical Interventions:   Ordered at: 09/14/21 0958     Level Of Support Discussed With:    Patient     Code Status:    CPR     Medical Interventions (Level of Support Prior to Arrest):    Full       Admission Status:  I believe this patient meets INPATIENT status due to acute CVA.   I feel patient’s risk for adverse outcomes and need for care warrant INPATIENT evaluation and I predict the patient’s care encounter to likely last beyond 2 midnights.      Antoinette Cho MD  09/14/21

## 2021-09-14 NOTE — THERAPY EVALUATION
Acute Care - Speech Language Pathology Initial Evaluation  [facility]   Cognitive-Communication Evaluation       Patient Name: Karyna Santiago  : 1950  MRN: [id number]  Today's Date: 2021               Admit Date: 2021     Visit Dx:    ICD-10-CM ICD-9-CM   1. Cerebrovascular accident (CVA), unspecified mechanism (CMS/HCC)  I63.9 434.91   2. Dysarthria  R47.1 784.51   3. Cognitive communication deficit  R41.841 799.52   4. Dysphagia, unspecified type  R13.10 787.20     Patient Active Problem List   Diagnosis   • Herniation of lumbar intervertebral disc   • Stroke (CMS/HCC)   • Essential hypertension   • Tobacco abuse   • Hypokalemia     Past Medical History:   Diagnosis Date   • Anemia    • Arthritis    • Blood disease    • Headache    • History of transfusion    • Hypertension      Past Surgical History:   Procedure Laterality Date   • GASTRIC BYPASS         SLP Recommendation and Plan  SLP Diagnosis: Patient presents with mild dysarthria and higher level word finding impairment.         Swallow Criteria for Skilled Therapeutic Interventions Met: demonstrates skilled criteria  SLC Criteria for Skilled Therapy Interventions Met: yes  Anticipated Discharge Disposition (SLP): unknown, anticipate therapy at next level of care     Therapy Frequency (Swallow): PRN  Predicted Duration Therapy Intervention (Days): until discharge     Plan for Continued Treatment (SLP): Initiate speech and language tx                 SLP EVALUATION (last 72 hours)      SLP SLC Evaluation     Row Name 21 1000                   Communication Assessment/Intervention    Document Type  evaluation  -        Subjective Information  complains of;pain  -        Patient Observations  alert;cooperative;agree to therapy  -        Patient/Family/Caregiver Comments/Observations  No family present  -        Care Plan Review  evaluation/treatment results reviewed;care plan/treatment goals reviewed;risks/benefits  reviewed;current/potential barriers reviewed;patient/other agree to care plan  -        Patient Effort  excellent  -        Symptoms Noted During/After Treatment  none  -           General Information    Patient Profile Reviewed  yes  -CH        Pertinent History Of Current Problem  Patient admitted on stroke protocol d/t L numbness/weakness, L facial droop, dysarthria. MRI pending. SLC-E and CSE completed per stroke protocol.  -        Precautions/Limitations, Vision  WFL;for purposes of eval  -CH        Precautions/Limitations, Hearing  WFL;for purposes of eval  -CH        Patient Level of Education  RN, retired nursing professor  -        Prior Level of Function-Communication  WFL  -        Plans/Goals Discussed with  patient;agreed upon  -        Barriers to Rehab  none identified  -        Patient's Goals for Discharge  return to home;return to all previous roles/activities  -           Pain    Additional Documentation  Pain Scale: Numbers Pre/Post-Treatment (Group);Pain Scale: FACES Pre/Post-Treatment (Group)  -           Pain Scale: Numbers Pre/Post-Treatment    Pretreatment Pain Rating  8/10  -CH        Posttreatment Pain Rating  8/10  -        Pain Location - Orientation  generalized  -        Pain Location  other (see comments) pt reported generalized pain all over  -        Pre/Posttreatment Pain Comment  RN notified and awaiting swallow eval and orders.   -           Pain Scale: FACES Pre/Post-Treatment    Pain: FACES Scale, Pretreatment  2-->hurts little bit  -        Posttreatment Pain Rating  2-->hurts little bit  -           Comprehension Assessment/Intervention    Comprehension Assessment/Intervention  Auditory Comprehension;Reading Comprehension  -           Auditory Comprehension Assessment/Intervention    Auditory Comprehension (Communication)  WNL  -        Answers Questions (Communication)  yes/no;wh questions;personal;simple;concrete;abstract;WFL  -CH         Able to Follow Commands (Communication)  3-step;WFL  -CH        Narrative Discourse  conversational level;WFL  -CH           Reading Comprehension Assessment/Intervention    Reading Comprehension (Communication)  WNL  -CH        Functional Reading Tasks  WNL  -CH           Expression Assessment/Intervention    Expression Assessment/Intervention  verbal expression  -CH           Verbal Expression Assessment/Intervention    Verbal Expression  mild impairment  -CH        Automatic Speech (Communication)  response to greeting;counting 1-20;WNL  -CH        Repetition  sentences;WNL  -CH        Phrase Completion  unpredictable;WNL  -CH        Responsive Naming  complex;mild impairment  -CH        Confrontational Naming  low frequency;WNL  -CH        Spontaneous/Functional Words  WNL  -CH        Sentence Formulation  complex;WNL  -CH        Conversational Discourse/Fluency  WNL  -CH        Verbal Expression, Comment  Mild difficulty with generative naming and low frequency responsive naming tasks  -           Oral Motor Structure and Function    Oral Motor Structure and Function  WFL  -        Dentition Assessment  edentulous, does not have dentures  -        Mucosal Quality  moist, healthy  -           Oral Musculature and Cranial Nerve Assessment    Oral Motor General Assessment  oral labial or buccal impairment  -CH        Oral Labial or Buccal Impairment, Detail, Cranial Nerve VII (Facial):  left labial droop  -           Motor Speech Assessment/Intervention    Motor Speech Function  mild impairment  -        Characteristics Consistent with Dysarthria  slurred speech;other (see comments) speech is 100% intelligible. Pt reported not baseline  -           Cursory Voice Assessment/Intervention    Quality and Resonance (Voice)  WNL  -CH           Cognitive Assessment Intervention- SLP    Cognitive Function (Cognition)  WNL  -CH        Orientation Status (Cognition)  awareness of basic personal  information;person;place;time;situation;WNL  -CH        Memory (Cognitive)  simple;functional;immediate;short-term;long-term;delayed;WNL  -CH        Attention (Cognitive)  selective;sustained;alternating;attention to detail;divided;WNL  -CH        Thought Organization (Cognitive)  concrete divergent;abstract divergent;concrete convergent;abstract convergent;verbal sequencing;mental manipulation;WFL  -        Reasoning (Cognitive)  simple;deductive;WNL  -        Problem Solving (Cognitive)  simple;divergent;temporal;WNL  -        Functional Math (Cognitive)  simple;word problems;WNL  -        Executive Function (Cognition)  WNL  -        Pragmatics (Communication)  WNL  -        Right Hemisphere Function  WNL  -           SLP Clinical Impressions    SLP Diagnosis  Patient presents with mild dysarthria and higher level word finding impairment.   -        Rehab Potential/Prognosis  excellent  -        SLC Criteria for Skilled Therapy Interventions Met  yes  -        Functional Impact  functional impact in social situations  -        Plan for Continued Treatment (SLP)  Initiate speech and language tx  -           Recommendations    Therapy Frequency (SLP SLC)  3 days per week  -        Predicted Duration Therapy Intervention (Days)  until discharge  -        Anticipated Discharge Disposition (SLP)  unknown;anticipate therapy at next level of care  -           SLP Discharge Summary    Discharge Destination  unknown;anticipated therapy at next level of care  -          User Key  (r) = Recorded By, (t) = Taken By, (c) = Cosigned By    Initials Name Effective Dates     Nan Barcenas, MS CCC-SLP 06/16/21 -              EDUCATION  The patient has been educated in the following areas:     Cognitive Impairment Communication Impairment.          SLP GOALS     Row Name 09/14/21 1000             Oral Nutrition/Hydration Goal 1 (SLP)    Oral Nutrition/Hydration Goal 1, SLP  STG: Pt. will  tolerate soft, whole diet consistency and thin liquids with small single sips and general precautions without s/s of aspiration in 100% of trials  -CH      Time Frame (Oral Nutrition/Hydration Goal 1, SLP)  by discharge  -         Oral Nutrition/Hydration Goal 2 (SLP)    Oral Nutrition/Hydration Goal 2, SLP  LTG: Pt. will tolerate soft, whole diet consistency and thin liquids with small single sips and general precautions without s/s of aspiration in 100% of trials  -CH      Time Frame (Oral Nutrition/Hydration Goal 2, SLP)  by discharge  -CH         Word Retrieval Skills Goal 1 (SLP)    Improve Word Retrieval Skills By Goal 1 (SLP)  low frequency;responsive naming task;supplying an antonym;supplying a synonym;completing a divergent task;80%;with minimal cues (75-90%)  -      Time Frame (Word Retrieval Goal 1, SLP)  by discharge  -         Articulation Goal 1 (SLP)    Improve Articulation Goal 1 (SLP)  by over-articulating in connected speech;80%;with minimal cues (75-90%)  -      Time Frame (Articulation Goal 1, SLP)  by discharge  -         Additional Goal 1 (SLP)    Additional Goal 1, SLP  LTG: Patient will improve speech and language skills to WFL without verbal cues   -      Time Frame (Additional Goal 1, SLP)  by discharge  -        User Key  (r) = Recorded By, (t) = Taken By, (c) = Cosigned By    Initials Name Provider Type    Nan Chavez MS CCC-SLP Speech and Language Pathologist                  Time Calculation:     Time Calculation- SLP     Row Name 09/14/21 1042             Time Calculation- SLP    SLP Start Time  1000  -CH      SLP Received On  09/14/21  -         Untimed Charges    SLP Eval/Re-eval   ST Eval Speech and Production w/ Language - 69862;ST Eval Oral Pharyng Swallow - 65041  -CH      15404-MS Eval Speech and Production w/ Language Minutes  45  -CH      16828-TH Eval Oral Pharyng Swallow Minutes  38  -CH         Total Minutes    Untimed Charges Total Minutes  83   -       Total Minutes  83  -CH        User Key  (r) = Recorded By, (t) = Taken By, (c) = Cosigned By    Initials Name Provider Type    Nan Chavez, MS CCC-SLP Speech and Language Pathologist          Therapy Charges for Today     Code Description Service Date Service Provider Modifiers Qty    56038925312 HC ST EVAL ORAL PHARYNG SWALLOW 3 2021 BarcenasNan green, MS CCC-SLP GN 1    44027103798 HC ST EVAL SPEECH AND PROD W LANG  3 2021 Nan Barcenas MS CCC-SLP GN 1                     Nan Barcenas MS CCC-SLP  2021 and Acute Care - Speech Language Pathology   Swallow Initial Evaluation [facility]   Clinical Swallow Evaluation       Patient Name: Karyna Santiago  : 1950  MRN: [id number]  Today's Date: 2021               Admit Date: 2021    Visit Dx:     ICD-10-CM ICD-9-CM   1. Cerebrovascular accident (CVA), unspecified mechanism (CMS/[facility])  I63.9 434.91   2. Dysarthria  R47.1 784.51   3. Cognitive communication deficit  R41.841 799.52   4. Dysphagia, unspecified type  R13.10 787.20     Patient Active Problem List   Diagnosis   • Herniation of lumbar intervertebral disc   • Stroke (CMS/[facility])   • Essential hypertension   • Tobacco abuse   • Hypokalemia     Past Medical History:   Diagnosis Date   • Anemia    • Arthritis    • Blood disease    • Headache    • History of transfusion    • Hypertension      Past Surgical History:   Procedure Laterality Date   • GASTRIC BYPASS         SLP Recommendation and Plan  SLP Swallowing Diagnosis: mild, oral dysphagia, R/O pharyngeal dysphagia  SLP Diet Recommendation: soft textures, whole, thin liquids  Recommended Precautions and Strategies: upright posture during/after eating, small bites of food and sips of liquid  SLP Rec. for Method of Medication Administration: meds whole, with pudding or applesauce, as tolerated     Monitor for Signs of Aspiration: yes, notify SLP if any concerns     Swallow Criteria for Skilled Therapeutic  Interventions Met: demonstrates skilled criteria  Anticipated Discharge Disposition (SLP): unknown, anticipate therapy at next level of care  Rehab Potential/Prognosis, Swallowing: good, to achieve stated therapy goals  Therapy Frequency (Swallow): PRN  Predicted Duration Therapy Intervention (Days): until discharge          Plan for Continued Treatment (SLP): Initiate speech and language tx                        SWALLOW EVALUATION (last 72 hours)      SLP Adult Swallow Evaluation     Row Name 09/14/21 1000                   General Information    Current Method of Nutrition  NPO  -CH        Prior Level of Function-Communication  WFL  -CH        Prior Level of Function-Swallowing  no diet consistency restrictions  -CH        Patient's Goals for Discharge  return to all previous roles/activities;return to PO diet  -CH           Oral Motor Structure and Function    Secretion Management  WNL/WFL  -CH        Volitional Swallow  WFL  -CH        Volitional Cough  WFL  -CH           General Eating/Swallowing Observations    Respiratory Support Currently in Use  nasal cannula  -CH        Eating/Swallowing Skills  self-fed;fed by SLP;appropriate self-feeding skills observed  -CH        Positioning During Eating  upright 90 degree;upright in bed  -CH        Utensils Used  spoon;cup;straw  -CH        Consistencies Trialed  soft textures;mixed consistency;pureed;ice chips;thin liquids  -CH        Pre SpO2 (%)  95  -CH        Post SpO2 (%)  93  -CH           Respiratory    Respiratory Status  WFL  -CH           Clinical Swallow Eval    Oral Prep Phase  impaired  -CH        Oral Transit  WFL  -CH        Oral Residue  WFL  -CH        Pharyngeal Phase  suspected pharyngeal impairment  -CH        Esophageal Phase  suspected esophageal impairment  -CH        Clinical Swallow Evaluation Summary  CSE completed. Patient accepted trials of ice, thin H2O via spoon/cup/straw, pureed, soft solids and mixed consistency solids. Patient  displayed throat clear x 1 with spoon sip of thin administered by SLP. No s/s of aspiration with small sips via cup or straw when patient self fed. Mildly prolonged mastication with soft solid trials but WFL for safety and nutritional needs. Patient displayed belching throughout evaluation but noted that she has not had her medicines awaiting swallowing evaluation. Recommend soft whole diet and thin liquids via small single sips. SLP to follow for diet tolerance. If continues with esophageal s/s after reflux meds are given, may require GI consult  -           Oral Prep Concerns    Oral Prep Concerns  prolonged mastication  -        Prolonged Mastication  mechanical soft  -CH        Oral Prep Concerns, Comment  Appears functional  -           Pharyngeal Phase Concerns    Pharyngeal Phase Concerns  throat clear  -CH        Throat Clear  thin  -CH           Esophageal Phase Concerns    Esophageal Phase Concerns  belching  -        Belching  all consistencies  -        Esophageal Phase Concerns, Comment  Patient has been unable to take reflux medication d/t being NPO prior to this swallow evaluation  -           Clinical Impression    SLP Swallowing Diagnosis  mild;oral dysphagia;R/O pharyngeal dysphagia  -        Functional Impact  risk of aspiration/pneumonia  -        Rehab Potential/Prognosis, Swallowing  good, to achieve stated therapy goals  -        Swallow Criteria for Skilled Therapeutic Interventions Met  demonstrates skilled criteria  -           Recommendations    Therapy Frequency (Swallow)  PRN  -        SLP Diet Recommendation  soft textures;whole;thin liquids  -        Recommended Precautions and Strategies  upright posture during/after eating;small bites of food and sips of liquid  -        Oral Care Recommendations  Oral Care BID/PRN  -        SLP Rec. for Method of Medication Administration  meds whole;with pudding or applesauce;as tolerated  -        Monitor for Signs of  Aspiration  yes;notify SLP if any concerns  -          User Key  (r) = Recorded By, (t) = Taken By, (c) = Cosigned By    Initials Name Effective Dates    Nan Chavez MS CCC-SLP 06/16/21 -           EDUCATION  The patient has been educated in the following areas:   Dysphagia (Swallowing Impairment) Modified Diet Instruction.       SLP GOALS     Row Name 09/14/21 1000             Oral Nutrition/Hydration Goal 1 (SLP)    Oral Nutrition/Hydration Goal 1, SLP  STG: Pt. will tolerate soft, whole diet consistency and thin liquids with small single sips and general precautions without s/s of aspiration in 100% of trials  -CH      Time Frame (Oral Nutrition/Hydration Goal 1, SLP)  by discharge  -         Oral Nutrition/Hydration Goal 2 (SLP)    Oral Nutrition/Hydration Goal 2, SLP  LTG: Pt. will tolerate soft, whole diet consistency and thin liquids with small single sips and general precautions without s/s of aspiration in 100% of trials  -CH      Time Frame (Oral Nutrition/Hydration Goal 2, SLP)  by discharge  -         Word Retrieval Skills Goal 1 (SLP)    Improve Word Retrieval Skills By Goal 1 (SLP)  low frequency;responsive naming task;supplying an antonym;supplying a synonym;completing a divergent task;80%;with minimal cues (75-90%)  -CH      Time Frame (Word Retrieval Goal 1, SLP)  by discharge  -CH         Articulation Goal 1 (SLP)    Improve Articulation Goal 1 (SLP)  by over-articulating in connected speech;80%;with minimal cues (75-90%)  -CH      Time Frame (Articulation Goal 1, SLP)  by discharge  -         Additional Goal 1 (SLP)    Additional Goal 1, SLP  LTG: Patient will improve speech and language skills to WFL without verbal cues   -      Time Frame (Additional Goal 1, SLP)  by discharge  -        User Key  (r) = Recorded By, (t) = Taken By, (c) = Cosigned By    Initials Name Provider Type    Nan Chavez MS CCC-SLP Speech and Language Pathologist             Time  Calculation:   Time Calculation- SLP     Row Name 09/14/21 1042             Time Calculation- SLP    SLP Start Time  1000  -CH      SLP Received On  09/14/21  -CH         Untimed Charges    SLP Eval/Re-eval   ST Eval Speech and Production w/ Language - 78808;ST Eval Oral Pharyng Swallow - 92778  -CH      39527-CA Eval Speech and Production w/ Language Minutes  45  -CH      24350-GX Eval Oral Pharyng Swallow Minutes  38  -CH         Total Minutes    Untimed Charges Total Minutes  83  -CH       Total Minutes  83  -CH        User Key  (r) = Recorded By, (t) = Taken By, (c) = Cosigned By    Initials Name Provider Type     Nan Barcenas MS CCC-SLP Speech and Language Pathologist          Therapy Charges for Today     Code Description Service Date Service Provider Modifiers Qty    83637739412 HC ST EVAL ORAL PHARYNG SWALLOW 3 9/14/2021 Nan Barcenas MS CCC-SLP GN 1    15839916939 HC ST EVAL SPEECH AND PROD W LANG  3 9/14/2021 Nan Barcenas MS CCC-NATHAN GN 1               MS LAUREN Iyer  9/14/2021     Patient was not wearing a face mask and did exhibit coughing during this therapy encounter.  Procedure performed was aerosolizing, involved close contact (within 6 feet for at least 15 minutes or longer), and did not involve contact with infectious secretions or specimens.  Therapist used appropriate personal protective equipment including gloves, standard procedure mask and eye protection.  Appropriate PPE was worn during the entire therapy session.  Hand hygiene was completed before and after therapy session.

## 2021-09-14 NOTE — PLAN OF CARE
Goal Outcome Evaluation:  Plan of Care Reviewed With: patient        Progress: no change  Outcome Summary: A&Ox4, VSS, on 2LNC. NIH 5- facial droop, LUE drift, sensory, dysarthria. Bedrest orders until MRI complete and cleared by neurology. Pressure injury on right gluteal fold related to incontinence and MASD, per patient. Wound cleansed and barrier cream applied. CT, echo, and SLP eval completed this shift, see results. PRN tramadol for c/o pain. Intervention effective per patient. No visitors seen this shift. Patient had 2 unmarked bottles of pills in her shorts upon arrival. When asked, patient stated one bottle contained tramadol and nitroglycerin, the other bottle had an array of pills. Advised patient that I would place them in our med room in her bin, also noted on admission belongings assessment. No further c/o pain or needs at this time.

## 2021-09-14 NOTE — CASE MANAGEMENT/SOCIAL WORK
Discharge Planning Assessment  [facility]     Patient Name: Karyna Santiago  MRN: [id number]  Today's Date: 9/14/2021    Admit Date: 9/14/2021    Discharge Needs Assessment     Row Name 09/14/21 6900       Living Environment    Lives With  spouse    Name(s) of Who Lives With Patient  Leoncio Santiago ()    Current Living Arrangements  home/apartment/condo    Primary Care Provided by  self    Provides Primary Care For  no one    Family Caregiver if Needed  friend(s)    Family Caregiver Names  Romi Talha (friend) [phone]    Able to Return to Prior Arrangements  yes       Resource/Environmental Concerns    Resource/Environmental Concerns  none    Transportation Concerns  car, none       Transition Planning    Patient/Family Anticipates Transition to  home with family;inpatient rehabilitation facility    Patient/Family Anticipated Services at Transition  home health care;rehabilitation services    Transportation Anticipated  family or friend will provide       Discharge Needs Assessment    Readmission Within the Last 30 Days  no previous admission in last 30 days    Equipment Currently Used at Home  walker, rolling    Concerns to be Addressed  denies needs/concerns at this time    Anticipated Changes Related to Illness  none    Equipment Needed After Discharge  none    Outpatient/Agency/Support Group Needs  inpatient rehabilitation facility;homecare agency    Discharge Facility/Level of Care Needs  home with home health;rehabilitation facility        Discharge Plan     Row Name 09/14/21 2453       Plan    Plan  Home with family    Plan Comments  Spoke with patient at bedside. Lives with Leoncio Santiago () in [facility] Contact is Romi Palencia (friend) [phone]. Is independent with ADL's. No problems with Medicare or medications. Uses a rolling walker at home. No advanced directives. Plan is home with  pending PT/OT recs. CM will continue to follow.        Continued Care and Services - Admitted  Since 9/14/2021    Coordination has not been started for this encounter.         Demographic Summary     Row Name 09/14/21 1329       General Information    Admission Type  inpatient    Arrived From  hospital    Referral Source  admission list    Reason for Consult  discharge planning    Preferred Language  English     Used During This Interaction  no       Contact Information    Permission Granted to Share Info With      Contact Information Obtained for      Contact Information Comments  PCP is Mason Miranda MD        Functional Status     Row Name 09/14/21 5090       Functional Status    Usual Activity Tolerance  good    Current Activity Tolerance  moderate       Functional Status, IADL    Medications  independent    Meal Preparation  independent    Housekeeping  independent    Laundry  independent    Shopping  independent       Mental Status    General Appearance WDL  WDL       Mental Status Summary    Recent Changes in Mental Status/Cognitive Functioning  no changes       Employment/    Employment Status  retired        Psychosocial    No documentation.       Abuse/Neglect    No documentation.       Legal    No documentation.       Substance Abuse    No documentation.       Patient Forms    No documentation.           Robert Bullock RN

## 2021-09-14 NOTE — CONSULTS
Chart review for diabetes educator consult.    At the time of this review patient A1c is 5.4 , they have no noted history of diabetes and no home medications noted for treatment of diabetes. At this time we do not feel the patient would benefit from diabetes education. Thank you for this consult, should patient needs change please re consult us.

## 2021-09-14 NOTE — CONSULTS
Stroke Consult Note    Patient Name: Karyna Santiago   MRN: [id number]  Age: 71 y.o.  Sex: female  : 1950    Primary Care Physician: Mason Miranda MD  Referring Physician:  Susana Pace DO        Handedness: Right  Race:     Chief Complaint/Reason for Consultation: Left upper extremity hemiparesis, decreased sensation, dysarthria    Subjective .  HPI: 71-year-old right-handed white female with known medical diagnoses of fibromyalgia, hypertension, restless leg syndrome, smoking, gastric bypass surgery, B12 deficiency, and hypertension who presents in transfer from [facility] where she presented with wake-up of dysarthria left upper extremity weakness and numbness.  According to patient her last known well was prior to falling asleep in her truck at 2100.  She woke up at 0330 and realized her left arm was weak and numb, she originally believes she had just slept on it wrong but when it did not improve she presented to outside facility.  She was outside of the window for any IV TPA therapy.  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  Upon arrival to [facility] she is seen urgently and taken immediately for CTA and perfusion that do not show any large vessel occlusion.  Her NIH upon arrival is a 5 due to decreased sensation in the left arm and face, mild dysarthria, and left upper extremity flaccid.  Apparently this has gotten worse since she presented to the ED this morning.    Previously took aspirin, but she took herself off that as she said it caused stomach irritation.  Denies any GI bleeding.    Last Known Normal Date/Time: 2100 EST     Review of Systems   HENT: Negative.    Eyes: Negative.    Respiratory: Negative.    Cardiovascular: Positive for palpitations.   Gastrointestinal: Positive for diarrhea.   Genitourinary: Negative.    Musculoskeletal: Positive for arthralgias.   Skin: Negative.     Neurological: Positive for speech difficulty, weakness and numbness.   Psychiatric/Behavioral: Negative.       Past Medical History:   Diagnosis Date   • Anemia    • Arthritis    • Blood disease    • Headache    • History of transfusion    • Hypertension      Past Surgical History:   Procedure Laterality Date   • GASTRIC BYPASS       Family History   Problem Relation Age of Onset   • Stroke Father      Social History     Socioeconomic History   • Marital status:      Spouse name: Not on file   • Number of children: Not on file   • Years of education: Not on file   • Highest education level: Not on file   Tobacco Use   • Smoking status: Current Every Day Smoker     Allergies   Allergen Reactions   • Clindamycin/Lincomycin    • Nsaids      Prior to Admission medications    Medication Sig Start Date End Date Taking? Authorizing Provider   acetaminophen (TYLENOL) 500 MG tablet Take 1,000 mg by mouth Every 6 (Six) Hours As Needed for Mild Pain (1-3).    Naz Ramos MD   citalopram (CeleXA) 20 MG tablet Take 20 mg by mouth Daily.    Naz Ramos MD   Cyanocobalamin (B-12 COMPLIANCE INJECTION IJ) Inject  as directed.    Naz Ramos MD   estrogens conjugated, synthetic A, (CENESTIN) 1.25 MG tablet Take 1.25 mg by mouth Daily.    Naz Ramos MD   oxybutynin (DITROPAN) 5 MG tablet Take 5 mg by mouth 3 (Three) Times a Day.    Naz Ramos MD   traMADol (ULTRAM) 50 MG tablet Take 100 mg by mouth Every 6 (Six) Hours As Needed for Moderate Pain (4-6).    Naz Ramos MD   triamterene-hydrochlorothiazide (MAXZIDE-25) 37.5-25 MG per tablet Take 1 tablet by mouth Daily.    Naz Ramos MD             Objective     Temp:  [98.3 °F (36.8 °C)] 98.3 °F (36.8 °C)  Heart Rate:  [80] 80  Resp:  [18] 18  BP: (129)/(82) 129/82  Neurological Exam  Mental Status  Awake, alert and oriented to person, place and time.Alert. Mild dysarthria present. Language is fluent with  no aphasia. Attention and concentration are normal. Fund of knowledge is appropriate for level of education.    Cranial Nerves  CN II: Visual fields full to confrontation.  CN III, IV, VI: Extraocular movements intact bilaterally. Pupils equal round and reactive to light bilaterally.  CN V:  Left: Diminished sensation of the entire left side of the face.  CN VII: Full and symmetric facial movement.  CN VIII: Hearing appears intact.  CN IX, X: Palate elevates symmetrically  CN XI: Shoulder shrug strength is normal.  CN XII: Tongue midline without atrophy or fasciculations.    Motor    Left upper extremity 1/5.    Sensory  Light touch abnormality: Sensation: Verbalizes decreased sensation to touch on the left upper extremity and left face.     Coordination  No obvious dysmetria.    Gait  Not observed.      Physical Exam  Vitals reviewed.   HENT:      Head: Normocephalic.      Mouth/Throat:      Mouth: Mucous membranes are dry.   Eyes:      Extraocular Movements: Extraocular movements intact.      Pupils: Pupils are equal, round, and reactive to light.   Pulmonary:      Effort: Pulmonary effort is normal.   Skin:     General: Skin is warm and dry.   Neurological:      Mental Status: She is alert.      Cranial Nerves: Cranial nerve deficit and dysarthria present.      Sensory: Sensory deficit present.      Motor: Weakness present.   Psychiatric:         Mood and Affect: Mood normal.         Acute Stroke Data    Alteplase (tPA) Inclusion / Exclusion Criteria    Time: 09:39 EDT  Person Administering Scale: NICHOLE Newman    Inclusion Criteria  [x]   18 years of age or greater   []   Onset of symptoms < 4.5 hours before beginning treatment (stroke onset = time patient was last seen well or without symptoms).   []   Diagnosis of acute ischemic stroke causing measurable disabling deficit (Complete Hemianopia, Any Aphasia, Visual or Sensory Extinction, Any weakness limiting sustained effort against gravity)   []   Any  remaining deficit considered potentially disabling in view of patient and practitioner   Exclusion criteria (Do not proceed with Alteplase if any are checked under exclusion criteria)  [x]   Onset unknown or GREATER than 4.5 hours   []   ICH on CT/MRI   []   CT demonstrates hypodensity representing acute or subacute infarct   []   Significant head trauma or prior stroke in the previous 3 months   []   Symptoms suggestive of subarachnoid hemorrhage   []   History of un-ruptured intracranial aneurysm GREATER than 10 mm   []   Recent intracranial or intraspinal surgery within the last 3 months   []   Arterial puncture at a non-compressible site in the previous 7 days   []   Active internal bleeding   []   Acute bleeding tendency   []   Platelet count LESS than 100,000 for known hematological diseases such as leukemia, thrombocytopenia or chronic cirrhosis   []   Current use of anticoagulant with INR GREATER than 1.7 or PT GREATER than 15 seconds, aPTT GREATER than 40 seconds   []   Heparin received within 48 hours, resulting in abnormally elevated aPTT GREATER than upper limit of normal   []   Current use of direct thrombin inhibitors or direct factor Xa inhibitors in the past 48 hours   []   Elevated blood pressure refractory to treatment (systolic GREATER than 185 mm/Hg or diastolic  GREATER than 110 mm/Hg   []   Suspected infective endocarditis and aortic arch dissection   []   Current use of therapeutic treatment dose of low-molecular-weight heparin (LMWH) within the previous 24 hours   []   Structural GI malignancy or bleed   Relative exclusion for all patients  []   Only minor non-disabling symptoms   []   Pregnancy   []   Seizure at onset with postictal residual neurological impairments   []   Major surgery or previous trauma within past 14 days   []   History of previous spontaneous ICH, intracranial neoplasm, or AV malformation   []   Postpartum (within previous 14 days)   []   Recent GI or urinary tract  hemorrhage (within previous 21 days)   []   Recent acute MI (within previous 3 months)   []   History of un-ruptured intracranial aneurysm LESS than 10 mm   []   History of ruptured intracranial aneurysm   []   Blood glucose LESS than 50 mg/dL (2.7 mmol/L)   []   Dural puncture within the last 7 days   []   Known GREATER than 10 cerebral microbleeds   Additional exclusions for patients with symptoms onset between 3 and 4.5 hours.  []   Age > 80.   []   On any anticoagulants regardless of INR  >>> Warfarin (Coumadin), Heparin, Enoxaparin (Lovenox), fondaparinux (Arixtra), bivalirudin (Angiomax), Argatroban, dabigatran (Pradaxa), rivaroxaban (Xarelto), or apixaban (Eliquis)   []   Severe stroke (NIHSS > 25).   []   History of BOTH diabetes and previous ischemic stroke.   []   The risks and benefits have been discussed with the patient or family related to the administration of IV Alteplase for stroke symptoms.   []   I have discussed and reviewed the patient's case and imaging with the attending prior to IV Alteplase.   n/a Time Alteplase administered       Hospital Meds:  Scheduled- aspirin, 81 mg, Oral, Daily   Or  aspirin, 300 mg, Rectal, Daily  atorvastatin, 80 mg, Oral, Nightly  clopidogrel, 75 mg, Oral, Daily  sodium chloride, 10 mL, Intravenous, Q12H      Infusions-     PRNs- sodium chloride    Functional Status Prior to Current Stroke/Redwood Score: 0    NIH Stroke Scale  Time: 09:39 EDT  Person Administering Scale: NICHOLE Newman    1a  Level of consciousness: 0=alert; keenly responsive   1b. LOC questions:  0=Performs both tasks correctly   1c. LOC commands: 0=Performs both tasks correctly   2.  Best Gaze: 0=normal   3.  Visual: 0=No visual loss   4. Facial Palsy: 0=Normal symmetric movement   5a.  Motor left arm: 3=No effort against gravity, limb falls   5b.  Motor right arm: 0=No drift, limb holds 90 (or 45) degrees for full 10 seconds   6a. motor left le=No drift, limb holds 90 (or 45) degrees  for full 10 seconds   6b  Motor right le=No drift, limb holds 90 (or 45) degrees for full 10 seconds   7. Limb Ataxia: 0=Absent   8.  Sensory: 1=Mild to moderate sensory loss; patient feels pinprick is less sharp or is dull on the affected side; there is a loss of superficial pain with pinprick but patient is aware She is being touched   9. Best Language:  0=No aphasia, normal   10. Dysarthria: 1=Mild to moderate, patient slurs at least some words and at worst, can be understood with some difficulty   11. Extinction and Inattention: 0=No abnormality    Total:   5       Results Reviewed:  I have personally reviewed current lab, radiology, and data and agree with results.    Head CT shows no acute intracranial findings, moderate chronic small vessel disease.  CT perfusion no reversible ischemia.  CTA head neck atherosclerotic disease noted, no significant stenosis or occlusion.          Assessment/Plan:      71-year-old with known stroke risk factors of hypertension, smoking, and previous infarcts for which she does not know about.  Patient presented to outside facility with left upper extremity weakness and dysarthria, her left arm has become flaccid in route to our hospital.  Her CTA and perfusion do not show any large vessel occlusion.    1.  Right hemisphere stroke-patient likely has had a right lacunar infarct.  Will check MRI brain without.  Initiate stroke order set, begin aspirin 81 and Plavix 75 mg daily.  Will check TTE with bubble study.  Hemoglobin A1c.  2.  Hypertension-okay to allow for autoregulation of blood pressure today.  Her blood pressure on arrival was 129/82.  3.  Hyperlipidemia-we will start high-dose statin, check fasting lipid profile and adjust dose as needed.  4.  Diet-patient will be n.p.o. until assessed by speech-language pathologist, patient does attest to feeling like she was choking this morning when she woke up.  5.  Activity-keep on bedrest for now as I do suspect a basal  ganglia area infarct, may adjust this after MRI brain is reviewed.  6.  History of gastric bypass and B12 deficiency-patient tells me she takes B12 injections, will check B12 and folate.    Plan was discussed with patient, nurse, and hospitalist, Dr. Cho.  All questions answered.  Stroke neurology will continue to follow patient.  Thank you for allowing us to participate in this patient's care.          NICHOLE Newman  September 14, 2021  09:39 EDT

## 2021-09-14 NOTE — NURSING NOTE
ACC REVIEW REPORT: [facility]        PATIENT NAME: Karyna Santiago    PATIENT ID: [id number]      COVID-19 ACC SCREENING       DOES THE PATIENT HAVE A FEVER GREATER THAN OR EQUAL .4: NO    IS THE PATIENT EXPERIENCING SHORTNESS OF BREATH: NO    DOES THE PATIENT HAVE A COUGH: NO    DOES THE PATIENT HAVE ANY OF THE FOLLOWING RISK FACTORS:    EXPOSURE TO SUSPECTED OR KNOWN COVID-19: NO    RECENT TRAVEL HISTORY TO ENDEMIC AREA (DOMESTIC/LOCAL): NO    IS THE PATIENT A HEALTHCARE WORKER: NO    HAS THE PATIENT EXPERIENCED A LOSS OF SENSE OF TASTE OR SMELL:  NO    HAS THE PATIENT BEEN TESTED FOR COVID-19: YES    DATE TESTED: 9/14/21    LAB TESTING SENT TO: [facility]      BED: S319    BED TYPE: TELE    BED GIVEN TO: JAYCEE MENDEZ    TIME BED GIVEN: 0550    TODAY'S DATE: 9/14/2021    TRANSFER DATE: 9/14/21    ETA: 0700    TRANSFERRING FACILITY: [facility]    TRANSFERRING FACILITY PHONE # : [phone]    TRANSFERRING MD: DR GONZALEZ    ACCEPTING PHYSICIAN: ROSALINO    NEUROLOGY PHYSICIAN: DAGOBERTO    DATE/TIME REQUEST RECEIVED: 9/14/21 0527    [facility] RN: JOSE RON RN    REPORT FROM: JAYCEE MENDEZ    TIME REPORT TAKEN: 0550    DIAGNOSIS: CVA    REASON FOR TRANSFER TO [facility]: HIGHER LEVEL OF CARE    TRANSPORTATION: EMS GROUND    CLINICAL REASON FOR TRANSFER TO [facility]: CVA      CLINICAL INFORMATION    HEIGHT: 5'2    WEIGHT: 126 LB    ALLERGIES: NKDA    VITAL SIGNS:   TIME: 0553  TEMP: 98.5  PULSE: 84  B/P: 132/71  RESP: 14      MEDS/IV FLUIDS: 18 RAC-SL      CARDIAC SYSTEM:    RATE: 84    RHYTHM: NSR    Is patient taking or has patient been given any drugs that could increase bleeding? NO HOME ANTICOAGULANTS    0544  MG GIVEN    TROPONIN: NEGATIVE      RESPIRATORY SYSTEM:    OXYGEN: 2L NC    O2 SAT: 99%    CNS/MUSCULOSKELETAL    LAST KNOWN WELL: 2100 9/13/21    NIHSS    Survey Item  0: Means Alert  1: Drowsy or Answer Correctly  2: Incorrect, Forced, Can't Resist Gravity  3: Complete or No Effort  4: No  Movement  NT: Not Testable Acceptable As Noted Above      1A: Level of Consciousness: 0    1B: LOC Questions (month, age) : 0    1C: LOC Commands (open/close eyes, make a fist & let go): 1    2:  Best Gaze (eyes open-pt follows examiner's fingers or face): 0    3:  Visual (introduce visual stimulus/threat to pt's visual field quad. Cover 1 eye and hold up fingers in all 4 quadrants) : 0    4.  Facial Palsy (show teeth, raise eyebrows and squeeze eyes tightly shut): 0    5A: Motor Arm-Left (elevate extremity to 90 degrees and score drift/movement.  Count to 10 aloud and use fingers for visual cue): 2    5B:  Motor Arm-Right (elevate extremity to 90 degrees and score drift/movement.  Count to 10 aloud and use fingers for visual cue): 0    6A:  Motor Leg-Left (elevate extremity to 30 degrees and score drift/movement.  Count to 5 out loud and use fingers for visual cue): 0    6B:  Motor Leg-Right (elevate extremity to 30 degrees and score drift/movement.  Count to 5 out loud and use fingers for visual cue): 0    7:  Limb Ataxia- finger to nose, heel down shin: 1    8:  Sensory- pin prick to face, arms, trunk, and legs. Compare sharpness side to side: 0    9:  Best Language- name, items, describe picture, and read sentences.  Do not forget glasses if they normally wear them: 0    10: Dysarthria- elevate speech clarity by pt reading or repeating words on a list: 0    11: Extinction and Inattention- Use information from prior testing or double simultaneous stimuli testing to identify neglect. Face, arms, legs and visual field: 0    Total NIHSS Score: 4  Date: 9/14/21  Time of NIHSS Assessment: 0445      CAT SCAN RESULTS: HEAD NEGATIVE      CNS/MUSCULOSKELETAL NOTES:   WENT TO BED AT 2100 WELL. WOKE UP AT 0300 WITH LEFT ARM WEAKNESS, NUMBNESS, AND TINGLING.    0544  MG GIVEN  NO OTHER MEDICATIONS GIVEN    PAST MEDICAL HISTORY: HTN, SMOKER      ADDITIONAL NOTES:   PT ADMITTED TO  WITH LEFT PARIETAL INJURY FROM COW  IN MAY. NO DEFICITS FROM INJURY.    PT A&O X 4  PT AMBULATORY    COVID RESULTS PENDING. VANESSA TO CALL WITH RESULTS. PT HAS NO COVID SYMPTOMS.          Kalani Bundy RN  9/14/2021  05:57 EDT

## 2021-09-15 ENCOUNTER — APPOINTMENT (OUTPATIENT)
Dept: CARDIOLOGY | Facility: HOSPITAL | Age: 71
End: 2021-09-15

## 2021-09-15 LAB
ANION GAP SERPL CALCULATED.3IONS-SCNC: 10 MMOL/L (ref 5–15)
APTT PPP: 28.1 SECONDS (ref 50–95)
BASOPHILS # BLD AUTO: 0.05 10*3/MM3 (ref 0–0.2)
BASOPHILS NFR BLD AUTO: 0.8 % (ref 0–1.5)
BH CV LOW VAS LEFT MID FEMORAL SPONT: 1
BH CV LOWER VASCULAR LEFT COMMON FEMORAL AUGMENT: NORMAL
BH CV LOWER VASCULAR LEFT COMMON FEMORAL COMPRESS: NORMAL
BH CV LOWER VASCULAR LEFT COMMON FEMORAL PHASIC: NORMAL
BH CV LOWER VASCULAR LEFT COMMON FEMORAL SPONT: NORMAL
BH CV LOWER VASCULAR LEFT DISTAL FEMORAL COMPRESS: NORMAL
BH CV LOWER VASCULAR LEFT GASTRONEMIUS COMPRESS: NORMAL
BH CV LOWER VASCULAR LEFT GREATER SAPH AK COMPRESS: NORMAL
BH CV LOWER VASCULAR LEFT LESSER SAPH COMPRESS: NORMAL
BH CV LOWER VASCULAR LEFT MID FEMORAL AUGMENT: NORMAL
BH CV LOWER VASCULAR LEFT MID FEMORAL COMPRESS: NORMAL
BH CV LOWER VASCULAR LEFT MID FEMORAL PHASIC: NORMAL
BH CV LOWER VASCULAR LEFT MID FEMORAL SPONT: NORMAL
BH CV LOWER VASCULAR LEFT MID FEMORAL THROMBUS: NORMAL
BH CV LOWER VASCULAR LEFT PERONEAL COMPRESS: NORMAL
BH CV LOWER VASCULAR LEFT POPLITEAL AUGMENT: NORMAL
BH CV LOWER VASCULAR LEFT POPLITEAL COMPRESS: NORMAL
BH CV LOWER VASCULAR LEFT POPLITEAL PHASIC: NORMAL
BH CV LOWER VASCULAR LEFT POPLITEAL SPONT: NORMAL
BH CV LOWER VASCULAR LEFT POSTERIOR TIBIAL COMPRESS: NORMAL
BH CV LOWER VASCULAR LEFT PROFUNDA FEMORAL COMPRESS: NORMAL
BH CV LOWER VASCULAR LEFT PROXIMAL FEMORAL COMPRESS: NORMAL
BH CV LOWER VASCULAR LEFT SAPHENOFEMORAL JUNCTION COMPRESS: NORMAL
BH CV LOWER VASCULAR RIGHT COMMON FEMORAL AUGMENT: NORMAL
BH CV LOWER VASCULAR RIGHT COMMON FEMORAL COMPRESS: NORMAL
BH CV LOWER VASCULAR RIGHT COMMON FEMORAL PHASIC: NORMAL
BH CV LOWER VASCULAR RIGHT COMMON FEMORAL SPONT: NORMAL
BH CV LOWER VASCULAR RIGHT DISTAL FEMORAL COMPRESS: NORMAL
BH CV LOWER VASCULAR RIGHT GASTRONEMIUS COMPRESS: NORMAL
BH CV LOWER VASCULAR RIGHT GREATER SAPH AK COMPRESS: NORMAL
BH CV LOWER VASCULAR RIGHT LESSER SAPH COMPRESS: NORMAL
BH CV LOWER VASCULAR RIGHT MID FEMORAL AUGMENT: NORMAL
BH CV LOWER VASCULAR RIGHT MID FEMORAL COMPRESS: NORMAL
BH CV LOWER VASCULAR RIGHT MID FEMORAL PHASIC: NORMAL
BH CV LOWER VASCULAR RIGHT MID FEMORAL SPONT: NORMAL
BH CV LOWER VASCULAR RIGHT PERONEAL COMPRESS: NORMAL
BH CV LOWER VASCULAR RIGHT POPLITEAL AUGMENT: NORMAL
BH CV LOWER VASCULAR RIGHT POPLITEAL COMPRESS: NORMAL
BH CV LOWER VASCULAR RIGHT POPLITEAL PHASIC: NORMAL
BH CV LOWER VASCULAR RIGHT POPLITEAL SPONT: NORMAL
BH CV LOWER VASCULAR RIGHT POSTERIOR TIBIAL COMPRESS: NORMAL
BH CV LOWER VASCULAR RIGHT PROFUNDA FEMORAL COMPRESS: NORMAL
BH CV LOWER VASCULAR RIGHT PROXIMAL FEMORAL COMPRESS: NORMAL
BH CV LOWER VASCULAR RIGHT SAPHENOFEMORAL JUNCTION COMPRESS: NORMAL
BUN SERPL-MCNC: 13 MG/DL (ref 8–23)
BUN/CREAT SERPL: 21.3 (ref 7–25)
CALCIUM SPEC-SCNC: 9 MG/DL (ref 8.6–10.5)
CHLORIDE SERPL-SCNC: 107 MMOL/L (ref 98–107)
CO2 SERPL-SCNC: 22 MMOL/L (ref 22–29)
CREAT SERPL-MCNC: 0.61 MG/DL (ref 0.57–1)
DEPRECATED RDW RBC AUTO: 51.8 FL (ref 37–54)
DEPRECATED RDW RBC AUTO: 55.8 FL (ref 37–54)
EOSINOPHIL # BLD AUTO: 0.13 10*3/MM3 (ref 0–0.4)
EOSINOPHIL NFR BLD AUTO: 2 % (ref 0.3–6.2)
ERYTHROCYTE [DISTWIDTH] IN BLOOD BY AUTOMATED COUNT: 14.8 % (ref 12.3–15.4)
ERYTHROCYTE [DISTWIDTH] IN BLOOD BY AUTOMATED COUNT: 15 % (ref 12.3–15.4)
GFR SERPL CREATININE-BSD FRML MDRD: 96 ML/MIN/1.73
GLUCOSE BLDC GLUCOMTR-MCNC: 108 MG/DL (ref 70–130)
GLUCOSE SERPL-MCNC: 97 MG/DL (ref 65–99)
HCT VFR BLD AUTO: 41.4 % (ref 34–46.6)
HCT VFR BLD AUTO: 44.2 % (ref 34–46.6)
HGB BLD-MCNC: 13 G/DL (ref 12–15.9)
HGB BLD-MCNC: 13.2 G/DL (ref 12–15.9)
IMM GRANULOCYTES # BLD AUTO: 0.06 10*3/MM3 (ref 0–0.05)
IMM GRANULOCYTES NFR BLD AUTO: 0.9 % (ref 0–0.5)
INR PPP: 1.02 (ref 0.85–1.16)
LYMPHOCYTES # BLD AUTO: 1.84 10*3/MM3 (ref 0.7–3.1)
LYMPHOCYTES NFR BLD AUTO: 28 % (ref 19.6–45.3)
MAXIMAL PREDICTED HEART RATE: 148 BPM
MCH RBC QN AUTO: 29.8 PG (ref 26.6–33)
MCH RBC QN AUTO: 29.9 PG (ref 26.6–33)
MCHC RBC AUTO-ENTMCNC: 29.9 G/DL (ref 31.5–35.7)
MCHC RBC AUTO-ENTMCNC: 31.4 G/DL (ref 31.5–35.7)
MCV RBC AUTO: 100.2 FL (ref 79–97)
MCV RBC AUTO: 95 FL (ref 79–97)
MONOCYTES # BLD AUTO: 0.87 10*3/MM3 (ref 0.1–0.9)
MONOCYTES NFR BLD AUTO: 13.2 % (ref 5–12)
NEUTROPHILS NFR BLD AUTO: 3.62 10*3/MM3 (ref 1.7–7)
NEUTROPHILS NFR BLD AUTO: 55.1 % (ref 42.7–76)
NRBC BLD AUTO-RTO: 0 /100 WBC (ref 0–0.2)
PLATELET # BLD AUTO: 260 10*3/MM3 (ref 140–450)
PLATELET # BLD AUTO: 263 10*3/MM3 (ref 140–450)
PMV BLD AUTO: 9.6 FL (ref 6–12)
PMV BLD AUTO: 9.7 FL (ref 6–12)
POTASSIUM SERPL-SCNC: 3.9 MMOL/L (ref 3.5–5.2)
POTASSIUM SERPL-SCNC: 4.2 MMOL/L (ref 3.5–5.2)
PROTHROMBIN TIME: 13.1 SECONDS (ref 11.4–14.4)
RBC # BLD AUTO: 4.36 10*6/MM3 (ref 3.77–5.28)
RBC # BLD AUTO: 4.41 10*6/MM3 (ref 3.77–5.28)
SODIUM SERPL-SCNC: 139 MMOL/L (ref 136–145)
STRESS TARGET HR: 126 BPM
UFH PPP CHRO-ACNC: 0.1 IU/ML (ref 0.3–0.7)
WBC # BLD AUTO: 6.57 10*3/MM3 (ref 3.4–10.8)
WBC # BLD AUTO: 7.34 10*3/MM3 (ref 3.4–10.8)

## 2021-09-15 PROCEDURE — 85027 COMPLETE CBC AUTOMATED: CPT | Performed by: INTERNAL MEDICINE

## 2021-09-15 PROCEDURE — 93970 EXTREMITY STUDY: CPT | Performed by: INTERNAL MEDICINE

## 2021-09-15 PROCEDURE — 85025 COMPLETE CBC W/AUTO DIFF WBC: CPT | Performed by: CLINICAL NURSE SPECIALIST

## 2021-09-15 PROCEDURE — 25010000002 HEPARIN (PORCINE) PER 1000 UNITS: Performed by: INTERNAL MEDICINE

## 2021-09-15 PROCEDURE — 97116 GAIT TRAINING THERAPY: CPT

## 2021-09-15 PROCEDURE — 25010000002 HEPARIN (PORCINE) 25000-0.45 UT/250ML-% SOLUTION: Performed by: CLINICAL NURSE SPECIALIST

## 2021-09-15 PROCEDURE — 93970 EXTREMITY STUDY: CPT

## 2021-09-15 PROCEDURE — 97110 THERAPEUTIC EXERCISES: CPT

## 2021-09-15 PROCEDURE — 92526 ORAL FUNCTION THERAPY: CPT

## 2021-09-15 PROCEDURE — 85730 THROMBOPLASTIN TIME PARTIAL: CPT | Performed by: CLINICAL NURSE SPECIALIST

## 2021-09-15 PROCEDURE — 85610 PROTHROMBIN TIME: CPT | Performed by: CLINICAL NURSE SPECIALIST

## 2021-09-15 PROCEDURE — 80048 BASIC METABOLIC PNL TOTAL CA: CPT | Performed by: INTERNAL MEDICINE

## 2021-09-15 PROCEDURE — 97161 PT EVAL LOW COMPLEX 20 MIN: CPT

## 2021-09-15 PROCEDURE — 92507 TX SP LANG VOICE COMM INDIV: CPT

## 2021-09-15 PROCEDURE — 99232 SBSQ HOSP IP/OBS MODERATE 35: CPT | Performed by: INTERNAL MEDICINE

## 2021-09-15 PROCEDURE — 85520 HEPARIN ASSAY: CPT | Performed by: CLINICAL NURSE SPECIALIST

## 2021-09-15 PROCEDURE — 97165 OT EVAL LOW COMPLEX 30 MIN: CPT

## 2021-09-15 PROCEDURE — 82962 GLUCOSE BLOOD TEST: CPT

## 2021-09-15 RX ORDER — HEPARIN SODIUM 1000 [USP'U]/ML
40 INJECTION, SOLUTION INTRAVENOUS; SUBCUTANEOUS AS NEEDED
Status: DISCONTINUED | OUTPATIENT
Start: 2021-09-15 | End: 2021-09-15

## 2021-09-15 RX ORDER — HEPARIN SODIUM 1000 [USP'U]/ML
3000 INJECTION, SOLUTION INTRAVENOUS; SUBCUTANEOUS ONCE
Status: COMPLETED | OUTPATIENT
Start: 2021-09-15 | End: 2021-09-15

## 2021-09-15 RX ORDER — HEPARIN SODIUM 1000 [USP'U]/ML
80 INJECTION, SOLUTION INTRAVENOUS; SUBCUTANEOUS ONCE
Status: DISCONTINUED | OUTPATIENT
Start: 2021-09-15 | End: 2021-09-15

## 2021-09-15 RX ORDER — HEPARIN SODIUM 1000 [USP'U]/ML
80 INJECTION, SOLUTION INTRAVENOUS; SUBCUTANEOUS AS NEEDED
Status: DISCONTINUED | OUTPATIENT
Start: 2021-09-15 | End: 2021-09-15

## 2021-09-15 RX ORDER — HEPARIN SODIUM 10000 [USP'U]/100ML
18 INJECTION, SOLUTION INTRAVENOUS
Status: DISCONTINUED | OUTPATIENT
Start: 2021-09-15 | End: 2021-09-17

## 2021-09-15 RX ADMIN — HEPARIN SODIUM 18 UNITS/KG/HR: 10000 INJECTION, SOLUTION INTRAVENOUS at 18:41

## 2021-09-15 RX ADMIN — CLOPIDOGREL BISULFATE 75 MG: 75 TABLET ORAL at 08:45

## 2021-09-15 RX ADMIN — SODIUM CHLORIDE, PRESERVATIVE FREE 10 ML: 5 INJECTION INTRAVENOUS at 20:17

## 2021-09-15 RX ADMIN — OXYBUTYNIN CHLORIDE 5 MG: 5 TABLET ORAL at 20:16

## 2021-09-15 RX ADMIN — HEPARIN SODIUM 5000 UNITS: 5000 INJECTION, SOLUTION INTRAVENOUS; SUBCUTANEOUS at 04:35

## 2021-09-15 RX ADMIN — SODIUM CHLORIDE, PRESERVATIVE FREE 10 ML: 5 INJECTION INTRAVENOUS at 08:45

## 2021-09-15 RX ADMIN — TRAMADOL HYDROCHLORIDE 100 MG: 50 TABLET, FILM COATED ORAL at 18:41

## 2021-09-15 RX ADMIN — CITALOPRAM HYDROBROMIDE 20 MG: 20 TABLET ORAL at 08:45

## 2021-09-15 RX ADMIN — TRAMADOL HYDROCHLORIDE 100 MG: 50 TABLET, FILM COATED ORAL at 04:32

## 2021-09-15 RX ADMIN — OXYBUTYNIN CHLORIDE 5 MG: 5 TABLET ORAL at 08:45

## 2021-09-15 RX ADMIN — ASPIRIN 81 MG CHEWABLE TABLET 81 MG: 81 TABLET CHEWABLE at 08:45

## 2021-09-15 RX ADMIN — Medication 1 PATCH: at 08:46

## 2021-09-15 RX ADMIN — HEPARIN SODIUM 5000 UNITS: 5000 INJECTION, SOLUTION INTRAVENOUS; SUBCUTANEOUS at 16:08

## 2021-09-15 RX ADMIN — HEPARIN SODIUM 3000 UNITS: 1000 INJECTION, SOLUTION INTRAVENOUS; SUBCUTANEOUS at 20:22

## 2021-09-15 RX ADMIN — ATORVASTATIN CALCIUM 80 MG: 40 TABLET, FILM COATED ORAL at 20:16

## 2021-09-15 RX ADMIN — OXYBUTYNIN CHLORIDE 5 MG: 5 TABLET ORAL at 16:08

## 2021-09-15 NOTE — PLAN OF CARE
Problem: Adult Inpatient Plan of Care  Goal: Plan of Care Review  Recent Flowsheet Documentation  Taken 9/15/2021 1323 by James Glover, OT  Progress: (OT eval) no change  Plan of Care Reviewed With: patient  Outcome Summary: Initial OT evaluation completed. Pt presents w/ significant LUE weakness, decreased fxl endurance, and impaired balance, sensation and coordination warranting skilled IP OT services to promote return to PLOF. Pt received on BSC, required Kary and BUE support for transfer to bed, MaxA for toileting skills, MaxA for doffing socks, and Kary for return to supine. Reviewed safety awareness w/ LUE during mobility and ADL performance, demo'd understanding. Pt educated on R shoulder AROM and elbow AAROM HEP, tolerated 2/8reps, encouraged to perform outside of therapy. Recommend d/c to IRF.

## 2021-09-15 NOTE — THERAPY TREATMENT NOTE
Acute Care - Speech Language Pathology Treatment Note  [facility]     Patient Name: Karyna Santiago  : 1950  MRN: [id number]  Today's Date: 9/15/2021               Admit Date: 2021     Visit Dx:    ICD-10-CM ICD-9-CM   1. Cerebrovascular accident (CVA), unspecified mechanism (CMS/HCC)  I63.9 434.91   2. Dysarthria  R47.1 784.51   3. Cognitive communication deficit  R41.841 799.52   4. Dysphagia, unspecified type  R13.10 787.20     Patient Active Problem List   Diagnosis   • Herniation of lumbar intervertebral disc   • Stroke (CMS/HCC)   • Essential hypertension   • Tobacco abuse   • Hypokalemia     Past Medical History:   Diagnosis Date   • Anemia    • Arthritis    • Blood disease    • Headache    • History of transfusion    • Hypertension      Past Surgical History:   Procedure Laterality Date   • GASTRIC BYPASS         SLP Recommendation and Plan              Anticipated Discharge Disposition (SLP): unknown, anticipate therapy at next level of care        Predicted Duration Therapy Intervention (Days): until discharge  Daily Summary of Progress (SLP): progress toward functional goals as expected  Plan for Continued Treatment (SLP): Will sign off for speech and language as patient has met goals. COntinue to follow to address diet tolerance with soft, chopped and thin liquids.                 SLP EVALUATION (last 72 hours)      SLP SLC Evaluation     Row Name 09/15/21 1529 21 1000                Communication Assessment/Intervention    Document Type  therapy note (daily note)  -  evaluation  -       Subjective Information  no complaints  -  complains of;pain  -       Patient Observations  alert;cooperative;agree to therapy  -  alert;cooperative;agree to therapy  -       Patient/Family/Caregiver Comments/Observations  No family present  -  No family present  -       Care Plan Review  evaluation/treatment results reviewed;care plan/treatment goals reviewed;risks/benefits  reviewed;current/potential barriers reviewed;patient/other agree to care plan  -  evaluation/treatment results reviewed;care plan/treatment goals reviewed;risks/benefits reviewed;current/potential barriers reviewed;patient/other agree to care plan  -       Patient Effort  excellent  -CH  excellent  -CH       Symptoms Noted During/After Treatment  none  -CH  none  -CH          General Information    Patient Profile Reviewed  yes  -CH  yes  -CH       Pertinent History Of Current Problem  --  Patient admitted on stroke protocol d/t L numbness/weakness, L facial droop, dysarthria. MRI pending. SLC-E and CSE completed per stroke protocol.  -       Precautions/Limitations, Vision  --  WFL;for purposes of eval  -CH       Precautions/Limitations, Hearing  --  WFL;for purposes of eval  -       Patient Level of Education  --  RN, retired nursing professor  -       Prior Level of Function-Communication  --  WFL  -       Plans/Goals Discussed with  --  patient;agreed upon  -       Barriers to Rehab  --  none identified  -       Patient's Goals for Discharge  --  return to home;return to all previous roles/activities  -          Pain    Additional Documentation  Pain Scale: FACES Pre/Post-Treatment (Group)  -  Pain Scale: Numbers Pre/Post-Treatment (Group);Pain Scale: FACES Pre/Post-Treatment (Group)  -          Pain Scale: Numbers Pre/Post-Treatment    Pretreatment Pain Rating  --  8/10  -       Posttreatment Pain Rating  --  8/10  -       Pain Location - Orientation  --  generalized  -       Pain Location  --  other (see comments) pt reported generalized pain all over  -       Pre/Posttreatment Pain Comment  --  RN notified and awaiting swallow eval and orders.   -          Pain Scale: FACES Pre/Post-Treatment    Pain: FACES Scale, Pretreatment  0-->no hurt  -CH  2-->hurts little bit  -       Posttreatment Pain Rating  0-->no hurt  -CH  2-->hurts little bit  -          Comprehension  Assessment/Intervention    Comprehension Assessment/Intervention  --  Auditory Comprehension;Reading Comprehension  -          Auditory Comprehension Assessment/Intervention    Auditory Comprehension (Communication)  --  WNL  -       Answers Questions (Communication)  --  yes/no;wh questions;personal;simple;concrete;abstract;[facility]  -       Able to Follow Commands (Communication)  --  3-step;[facility]  -       Narrative Discourse  --  conversational level;[facility]  -          Reading Comprehension Assessment/Intervention    Reading Comprehension (Communication)  --  WNL  -       Functional Reading Tasks  --  WNL  -          Expression Assessment/Intervention    Expression Assessment/Intervention  --  verbal expression  -          Verbal Expression Assessment/Intervention    Verbal Expression  --  mild impairment  -       Automatic Speech (Communication)  --  response to greeting;counting 1-20;[facility]  -       Repetition  --  sentences;[facility]  -       Phrase Completion  --  unpredictable;[facility]  -       Responsive Naming  --  complex;mild impairment  -       Confrontational Naming  --  low frequency;[facility]  -       Spontaneous/Functional Words  --  [facility]  -       Sentence Formulation  --  complex;[facility]  -       Conversational Discourse/Fluency  --  [facility]  -       Verbal Expression, Comment  --  Mild difficulty with generative naming and low frequency responsive naming tasks  -          Oral Motor Structure and Function    Oral Motor Structure and Function  --  [facility]  -       Dentition Assessment  --  edentulous, does not have dentures  -       Mucosal Quality  --  moist, healthy  -          Oral Musculature and Cranial Nerve Assessment    Oral Motor General Assessment  --  oral labial or buccal impairment  -       Oral Labial or Buccal Impairment, Detail, Cranial Nerve VII (Facial):  --  left labial droop  -          Motor Speech Assessment/Intervention    Motor Speech Function  --  mild impairment  -        Characteristics Consistent with Dysarthria  --  slurred speech;other (see comments) speech is 100% intelligible. Pt reported not baseline  -          Cursory Voice Assessment/Intervention    Quality and Resonance (Voice)  --  WNL  -          Cognitive Assessment Intervention- SLP    Cognitive Function (Cognition)  --  WNL  -       Orientation Status (Cognition)  --  awareness of basic personal information;person;place;time;situation;WNL  -       Memory (Cognitive)  --  simple;functional;immediate;short-term;long-term;delayed;WNL  -       Attention (Cognitive)  --  selective;sustained;alternating;attention to detail;divided;WNL  -       Thought Organization (Cognitive)  --  concrete divergent;abstract divergent;concrete convergent;abstract convergent;verbal sequencing;mental manipulation;L  -       Reasoning (Cognitive)  --  simple;deductive;WNL  -       Problem Solving (Cognitive)  --  simple;divergent;temporal;WNL  -       Functional Math (Cognitive)  --  simple;word problems;WNL  -       Executive Function (Cognition)  --  WNL  -       Pragmatics (Communication)  --  WNL  -       Right Hemisphere Function  --  WNL  -          SLP Clinical Impressions    Daily Summary of Progress (SLP)  progress toward functional goals as expected  -  --       SLP Diagnosis  --  Patient presents with mild dysarthria and higher level word finding impairment.   -       Rehab Potential/Prognosis  --  excellent  -[facility] Criteria for Skilled Therapy Interventions Met  --  yes  -       Functional Impact  --  functional impact in social situations  -       Plan for Continued Treatment (SLP)  Will sign off for speech and language as patient has met goals. COntinue to follow to address diet tolerance with soft, chopped and thin liquids.  -  Initiate speech and language tx  -          Recommendations    Therapy Frequency (SLP SLC)  PRN  -CH  3 days per week  -       Predicted Duration  Therapy Intervention (Days)  until discharge  -  until discharge  -       Anticipated Discharge Disposition (SLP)  unknown;anticipate therapy at next level of care  -CH  unknown;anticipate therapy at next level of care  -          SLP Discharge Summary    Discharge Destination  --  unknown;anticipated therapy at next level of care  -         User Key  (r) = Recorded By, (t) = Taken By, (c) = Cosigned By    Initials Name Effective Dates     BarcenasNan, MS CCC-SLP 06/16/21 -              EDUCATION  The patient has been educated in the following areas:     Cognitive Impairment Communication Impairment Dysphagia (Swallowing Impairment) Oral Care/Hydration Modified Diet Instruction.          SLP GOALS     Row Name 09/15/21 1529 09/14/21 1000          Oral Nutrition/Hydration Goal 1 (SLP)    Oral Nutrition/Hydration Goal 1, SLP  STG: Pt. will tolerate soft, chopped diet consistency and thin liquids with small single sips and general precautions without s/s of aspiration in 100% of trials  -CH  STG: Pt. will tolerate soft, whole diet consistency and thin liquids with small single sips and general precautions without s/s of aspiration in 100% of trials  -CH     Time Frame (Oral Nutrition/Hydration Goal 1, SLP)  by discharge  -  by discharge  -CH     Barriers (Oral Nutrition/Hydration Goal 1, SLP)  Patient having difficulty w mastication of soft whole solids. Diet consistency changed to soft, chopped and thin liquids with added sauce/gravy for moisture  -  --     Progress/Outcomes (Oral Nutrition/Hydration Goal 1, SLP)  continuing progress toward goal;goal revised this date  -  --        Oral Nutrition/Hydration Goal 2 (SLP)    Oral Nutrition/Hydration Goal 2, SLP  LTG: Pt. will tolerate soft, whole diet consistency and thin liquids with small single sips and general precautions without s/s of aspiration in 100% of trials  -CH  LTG: Pt. will tolerate soft, whole diet consistency and thin liquids with  small single sips and general precautions without s/s of aspiration in 100% of trials  -CH     Time Frame (Oral Nutrition/Hydration Goal 2, SLP)  by discharge  -CH  by discharge  -CH     Barriers (Oral Nutrition/Hydration Goal 2, SLP)  Patient having difficulty w mastication of soft whole solids. Diet consistency changed to soft, chopped and thin liquids with added sauce/gravy for moisture  -CH  --     Progress/Outcomes (Oral Nutrition/Hydration Goal 2, SLP)  continuing progress toward goal  -CH  --        Word Retrieval Skills Goal 1 (SLP)    Improve Word Retrieval Skills By Goal 1 (SLP)  low frequency;responsive naming task;supplying an antonym;supplying a synonym;completing a divergent task;80%;with minimal cues (75-90%)  -CH  low frequency;responsive naming task;supplying an antonym;supplying a synonym;completing a divergent task;80%;with minimal cues (75-90%)  -CH     Time Frame (Word Retrieval Goal 1, SLP)  by discharge  -CH  by discharge  -CH     Progress (Word Retrieval Skills Goal 1, SLP)  100%;independently (over 90% accuracy)  -CH  --     Progress/Outcomes (Word Retrieval Goal 1, SLP)  goal met  -CH  --        Articulation Goal 1 (SLP)    Improve Articulation Goal 1 (SLP)  by over-articulating in connected speech;80%;with minimal cues (75-90%)  -CH  by over-articulating in connected speech;80%;with minimal cues (75-90%)  -CH     Time Frame (Articulation Goal 1, SLP)  by discharge  -CH  by discharge  -CH     Progress (Articulation Goal 1, SLP)  90%;with minimal cues (75-90%)  -CH  --     Progress/Outcomes (Articulation Goal 1, SLP)  goal met  -CH  --        Additional Goal 1 (SLP)    Additional Goal 1, SLP  LTG: Patient will improve speech and language skills to WFL without verbal cues   -CH  LTG: Patient will improve speech and language skills to WFL without verbal cues   -CH     Time Frame (Additional Goal 1, SLP)  by discharge  -CH  by discharge  -CH     Progress/Outcomes (Additional Goal 1, SLP)  goal  met  -CH  --       User Key  (r) = Recorded By, (t) = Taken By, (c) = Cosigned By    Initials Name Provider Type    Nan Chavez MS CCC-SLP Speech and Language Pathologist                  Time Calculation:     Time Calculation- SLP     Row Name 09/15/21 1612             Time Calculation- SLP    SLP Start Time  1529  -      SLP Received On  09/15/21  -         Untimed Charges    82188-LO Treatment/ST Modification Prosth Aug Alter   30  -CH      96791-NH Treatment Swallow Minutes  33  -CH         Total Minutes    Untimed Charges Total Minutes  63  -CH       Total Minutes  63  -CH        User Key  (r) = Recorded By, (t) = Taken By, (c) = Cosigned By    Initials Name Provider Type    Nan Chavez MS CCC-SLP Speech and Language Pathologist          Therapy Charges for Today     Code Description Service Date Service Provider Modifiers Qty    99340718572 HC ST EVAL ORAL PHARYNG SWALLOW 3 9/14/2021 Nan Barcenas MS CCC-SLP GN 1    07677311530 HC ST EVAL SPEECH AND PROD W LANG  3 9/14/2021 Nan Barcenas MS CCC-SLP GN 1    54703195361 HC ST TREATMENT SWALLOW 2 9/15/2021 Nan Barcenas MS CCC-SLP GN 1    83371424921 HC ST TREATMENT SPEECH 2 9/15/2021 Nan Barcenas MS CCC-SLP GN 1                     Nan Barcenas MS CCC-SLP  9/15/2021       Patient was not wearing a face mask and did not exhibit coughing during this therapy encounter.  Procedure performed was aerosolizing, did not involve close contact (within 6 feet for at least 15 minutes or longer), and did not involve contact with infectious secretions or specimens.  Therapist used appropriate personal protective equipment including gloves, standard procedure mask and eye protection.  Appropriate PPE was worn during the entire therapy session.  Hand hygiene was completed before and after therapy session.

## 2021-09-15 NOTE — PROGRESS NOTES
Clinical Nutrition   Reason For Visit: Unintentional weight loss    Patient Name: Karyna Santiago  YOB: 1950  MRN: [id number]  Date of Encounter: 09/15/21 11:40 EDT  Admission date: 9/14/2021    Comments:   - RD ordered chocolate Premier Protein once daily.    - Pt meets criteria for moderate (non-severe) malnutrition in the context of chronic illness as evidenced by moderate subcutaneous fat loss and severe muscle loss per NFPE. See MSA note.    Nutrition Assessment     Admission Problem List:    Stroke (CMS/HCC)    Hypokalemia     Applicable medical tests/procedures since admission:  9/15: SLP following    SLP Recommendation and Plan  SLP Swallowing Diagnosis: mild, oral dysphagia, R/O pharyngeal dysphagia  SLP Diet Recommendation: soft textures, whole, thin liquids    PMH: She  has a past medical history of Anemia, Arthritis, Blood disease, Headache, History of transfusion, and Hypertension.   PSxH: She  has a past surgical history that includes Gastric bypass.     Reported/Observed/Food/Nutrition Related History     Pt resting in bed at time of RD visit. Pt reported UBW of 125 lbs. Pt reported she broke her dentures but does not want texture modifications at this time - RD discussed soft food options on menu and encouraged pt to order preferred options. Pt stated she had gastric bypass about 42 years ago. Pt takes MVI at home and receives B-12 injections. Pt endorses good appetite now and PTA. Pt avoids dairy as it causes diarrhea. Pt dislikes ONS and has tried Ensure/Boost. RD will trial Premier Protein.     Anthropometrics   Height: 62 in  Weight: 121 lbs (bed scale on 9/14)  BMI: 22.25  BMI classification: Normal: 18.5-24.9kg/m2   IBW: 110 lbs  UBW: 125 lbs per pt  Weight change: Potential unintentional wt loss of ~4 lbs over unknown timeframe as pt reported UBW of 125 lbs.    Weight Weight (kg) Weight (lbs) Weight Method   9/14/2021 55.2 kg 121 lb 11.1 oz -   9/14/2021 55.157 kg 121 lb 9.6 oz Bed  scale   9/14/2021 55.157 kg 121 lb 9.6 oz -   4/11/2017 49.896 kg 110 lb -     Nutrition Focused Physical Exam - 9/15     Subcutaneous fat:  Orbital Moderate   Buccal Moderate   Tricep Moderate   Ribs- thoracic and lumbar region, lower back, midaxillary line Unable to determine at this time     Muscle:  Temple/temporalis Moderate   Clavicle/acromion- pectoralis, deltoid Severe   Shoulder- deltoid Severe   Scapula- trapezius, latissimus dorsi Unable to determine at this time   Interosseous- dorsal hand Unable to determine at this time   Thigh- quadriceps Mild   Calf- gastrocnemius Mild     Potential signs/symptoms of micronutrient deficiency: hx of gastric bypass    Labs reviewed   Labs reviewed: Yes    Results from last 7 days   Lab Units 09/14/21  2349 09/14/21  0918   SODIUM mmol/L  --  137   POTASSIUM mmol/L 4.2 3.1*   CHLORIDE mmol/L  --  101   CO2 mmol/L  --  27.0   BUN mg/dL  --  13   CREATININE mg/dL  --  0.62   GLUCOSE mg/dL  --  98   CALCIUM mg/dL  --  8.6   CHOLESTEROL mg/dL  --  134   TRIGLYCERIDES mg/dL  --  58     Results from last 7 days   Lab Units 09/14/21  0918 09/14/21  0833   WBC 10*3/mm3  --  6.87   ALBUMIN g/dL 3.00*  --    CHOLESTEROL mg/dL 134  --    TRIGLYCERIDES mg/dL 58  --      Results from last 7 days   Lab Units 09/15/21  0502 09/14/21  2328 09/14/21  1715   GLUCOSE mg/dL 108 82 114     Lab Results   Lab Value Date/Time    HGBA1C 5.40 09/14/2021 0833     Medications reviewed   Medications reviewed: Yes  Pertinent: heparin    Current Nutrition Prescription   PO: Diet Regular; Cardiac, Consistent Carbohydrate  Oral Nutrition Supplement:     Average PO intake: insufficient data    Nutrition Diagnosis     Problem Malnutrition    Etiology Energy needs > energy intake   Signs/Symptoms Severe muscle loss and moderate subcutaneous fat loss per NFPE.     Intervention   Intervention: Follow treatment progress, Care plan reviewed, Interview for preferences, Encourage intake, Supplement provided      - RD ordered chocolate Premier Protein once daily.    Goal:   General: Nutrition support treatment  PO: Increase intake     Additional goals: No further weight loss    Monitoring/Evaluation:   Monitoring/Evaluation: Per protocol, PO intake, Supplement intake, Pertinent labs, Weight, Skin status, POC/GOC, Swallow function    Edmond Velasco RD  Time Spent: 45 min

## 2021-09-15 NOTE — PLAN OF CARE
Goal Outcome Evaluation:  Plan of Care Reviewed With: patient     AOX4, 2L NC, NIH:4, VSS. PRN pain med X1 moderate generalized pain. LUE regaining some strength reported by patient. Pressure injury on R gluteal fold cleansed and barrier cream applied X2. MRI completed as ordered. BG D/C per protocol with 2 normal BG. Bedrest maintained. Will continue to monitor.     Progress: improving

## 2021-09-15 NOTE — PLAN OF CARE
Goal Outcome Evaluation:  Plan of Care Reviewed With: patient    SLP treatment completed. Will continue to address diet tolerance and will sign off speech and language tx.  Please see note for further details and recommendations.

## 2021-09-15 NOTE — THERAPY EVALUATION
Patient Name: Karyna Santiago  : 1950    MRN: [id number]                              Today's Date: 9/15/2021       Admit Date: 2021    Visit Dx:     ICD-10-CM ICD-9-CM   1. Cerebrovascular accident (CVA), unspecified mechanism (CMS/HCC)  I63.9 434.91   2. Dysarthria  R47.1 784.51   3. Cognitive communication deficit  R41.841 799.52   4. Dysphagia, unspecified type  R13.10 787.20     Patient Active Problem List   Diagnosis   • Herniation of lumbar intervertebral disc   • Stroke (CMS/HCC)   • Essential hypertension   • Tobacco abuse   • Hypokalemia     Past Medical History:   Diagnosis Date   • Anemia    • Arthritis    • Blood disease    • Headache    • History of transfusion    • Hypertension      Past Surgical History:   Procedure Laterality Date   • GASTRIC BYPASS       General Information     Row Name 09/15/21 1237          OT Time and Intention    Document Type  evaluation  -CS     Mode of Treatment  occupational therapy  -CS     Row Name 09/15/21 1237          General Information    Patient Profile Reviewed  yes  -CS     Prior Level of Function  independent:;bed mobility;all household mobility Pt reports Independent ADL performance, RW/SPC prn around the house, reports no AD use in garden/lawn, report using walk-in shower in basement (step-in tub on main floor), no bath seating reported  -CS     Existing Precautions/Restrictions  fall;oxygen therapy device and L/min;other (see comments) L-sided weakness  -CS     Barriers to Rehab  medically complex  -CS     Row Name 09/15/21 1237          Living Environment    Lives With  spouse  -CS     Row Name 09/15/21 1237          Home Main Entrance    Number of Stairs, Main Entrance  two  -CS     Row Name 09/15/21 1237          Stairs Within Home, Primary    Stairs, Within Home, Primary  Two level home over basement w/ flight(s) to 2nd floor and basement, walk-in shower located in basement, step-in tub on main floor, pull-up bar at commode, no bath seating  reported  -CS     Number of Stairs, Within Home, Primary  other (see comments)  -CS     Row Name 09/15/21 1237          Cognition    Orientation Status (Cognition)  oriented x 3  -CS     Row Name 09/15/21 1237          Safety Issues, Functional Mobility    Safety Issues Affecting Function (Mobility)  insight into deficits/self-awareness;safety precaution awareness;safety precautions follow-through/compliance  -CS     Impairments Affecting Function (Mobility)  balance;coordination;endurance/activity tolerance;grasp;motor control;motor planning;strength;muscle tone abnormal  -CS       User Key  (r) = Recorded By, (t) = Taken By, (c) = Cosigned By    Initials Name Provider Type    CS James Glover, OT Occupational Therapist          Mobility/ADL's     Row Name 09/15/21 1246          Bed Mobility    Bed Mobility  sit-supine;scooting/bridging  -CS     Scooting/Bridging [place] (Bed Mobility)  minimum assist (75% patient effort);verbal cues  -CS     Sit-Supine [place] (Bed Mobility)  minimum assist (75% patient effort);verbal cues  -CS     Assistive Device (Bed Mobility)  head of bed elevated;bed rails  -CS     Comment (Bed Mobility)  verbal cues for safety awareness w/ LUE  -CS     Row Name 09/15/21 1246          Transfers    Transfers  toilet transfer  -CS     [place] Level (Toilet Transfer)  minimum assist (75% patient effort);verbal cues  -CS     Assistive Device (Toilet Transfer)  other (see comments);commode, bedside without drop arms BUE support  -CS     Row Name 09/15/21 1246          Toilet Transfer    Type (Toilet Transfer)  stand pivot/stand step  -CS     Row Name 09/15/21 1246          Activities of Daily Living    BADL Assessment/Intervention  lower body dressing;feeding;toileting  -CS     Row Name 09/15/21 1246          Lower Body Dressing Assessment/Training    [place] Level (Lower Body Dressing)  doff;socks;maximum assist (25% patient effort)  -CS     Position (Lower Body Dressing)   edge of bed sitting  -CS     Comment (Lower Body Dressing)  Pt able to cross legs, limitted by LUE weakness and poor bimanual skills  -CS     Row Name 09/15/21 1246          Toileting Assessment/Training    [employer] Level (Toileting)  perform perineal hygiene;dependent (less than 25% patient effort);adjust/manage clothing;moderate assist (50% patient effort)  -CS     Position (Toileting)  supported standing  -CS     Comment (Toileting)  CGA for standing balance during dependent kimo-care completed by therapist, able to assist w/ clothing mngt w/ RUE  -CS       User Key  (r) = Recorded By, (t) = Taken By, (c) = Cosigned By    Initials Name Provider Type    CS James Glover, OT Occupational Therapist        Obj/Interventions     [facility] Name 09/15/21 1318          Sensory Assessment (Somatosensory)    Sensory Assessment (Somatosensory)  right-sided sensation intact;left UE  -CS     Left UE Sensory Assessment  general sensation;impaired Pt responded correctly to lt touch stimuli presented bilaterally, reports feeling different/odd  -[facility] Name 09/15/21 1318          Vision Assessment/Intervention    Visual Impairment/Limitations  WFL;corrective lenses for reading;other (see comments) able to read time on monitor across room, tracks bilaterally w/ L peripheral intact  -     Row Name 09/15/21 1318          Range of Motion Comprehensive    General Range of Motion  bilateral upper extremity ROM WFL  -[facility] Name 09/15/21 1318          Strength Comprehensive (MMT)    General Manual Muscle Testing (MMT) Assessment  upper extremity strength deficits identified  -CS     Comment, General Manual Muscle Testing (MMT) Assessment  RUE grossly 4/5, L shoulder grossly 3-/5, L elbow flex/ext 1/5, L wrist/grasp 0/5  -     Row Name 09/15/21 1318          Shoulder (Therapeutic Exercise)    Shoulder (Therapeutic Exercise)  AROM (active range of motion)  -CS     Shoulder AROM (Therapeutic Exercise)  left;scapular  elevation;scapular retraction;sitting;3 sets;5 repetitions  -     Row Name 09/15/21 1318          Elbow/Forearm (Therapeutic Exercise)    Elbow/Forearm (Therapeutic Exercise)  AAROM (active assistive range of motion)  -     Elbow/Forearm AAROM (Therapeutic Exercise)  right assist left;bilateral;flexion;extension;10 repetitions  -     Row Name 09/15/21 1318          Motor Skills    Motor Skills  coordination;functional endurance  -     Coordination  left;upper extremity;finger to nose;bimanual skills;severe impairment  -     Functional Endurance  O2 sats remained > 92% on 2Lnc throughout activities  -     Row Name 09/15/21 1318          Balance    Balance Assessment  sitting static balance;sitting dynamic balance;standing static balance;standing dynamic balance  -     Static Sitting Balance  WFL;unsupported;sitting, edge of bed  -CS     Dynamic Sitting Balance  mild impairment;unsupported;sitting, edge of bed  -CS     Static Standing Balance  mild impairment;supported;standing  -CS     Dynamic Standing Balance  moderate impairment;supported;standing  -     Comment, Balance  CGA during standing kimo-care w/ BUE support, CGA during dynamic seated task  -     Row Name 09/15/21 1318          Therapeutic Exercise    Therapeutic Exercise  shoulder;elbow/forearm  -       User Key  (r) = Recorded By, (t) = Taken By, (c) = Cosigned By    Initials Name Provider Type    CS James Glover, OT Occupational Therapist        Goals/Plan     Row Name 09/15/21 1331          Bed Mobility Goal 1 (OT)    Activity/Assistive Device (Bed Mobility Goal 1, OT)  sit to supine;supine to sit;scooting  -CS     [place] Level/Cues Needed (Bed Mobility Goal 1, OT)  contact guard assist  -CS     Time Frame (Bed Mobility Goal 1, OT)  long term goal (LTG);by discharge  -     Strategies/Barriers (Bed Mobility Goal 1, OT)  LUE weakness  -CS     Progress/Outcomes (Bed Mobility Goal 1, OT)  goal ongoing  -     Row Name  09/15/21 1331          Transfer Goal 1 (OT)    Activity/Assistive Device (Transfer Goal 1, OT)  sit-to-stand/stand-to-sit;commode, bedside without drop arms  -CS     Post Level/Cues Needed (Transfer Goal 1, OT)  contact guard assist  -CS     Time Frame (Transfer Goal 1, OT)  long term goal (LTG);by discharge  -CS     Strategies/Barriers (Transfers Goal 1, OT)  AD per PT recs  -CS     Row Name 09/15/21 1331          Dressing Goal 1 (OT)    Activity/Device (Dressing Goal 1, OT)  upper body dressing  -CS     Post/Cues Needed (Dressing Goal 1, OT)  minimum assist (75% or more patient effort);verbal cues required  -CS     Time Frame (Dressing Goal 1, OT)  long term goal (LTG);10 days  -CS     Strategies/Barriers (Dressing Goal 1, OT)  julio-dressing technique and improved LUE involvement w/ motor return  -CS     Progress/Outcome (Dressing Goal 1, OT)  goal ongoing  -CS     Row Name 09/15/21 1331          ROM Goal 1 (OT)    ROM Goal 1 (OT)  Pt will rom [place] HEP (rt assist lft) independently by discharge to promote increased fxl strength for ADL performance  -CS     Time Frame (ROM Goal 1, OT)  long term goal (LTG);by discharge  -CS     Progress/Outcome (ROM Goal 1, OT)  goal ongoing  -CS       User Key  (r) = Recorded By, (t) = Taken By, (c) = Cosigned By    Initials Name Provider Type    CS James Glover, OT Occupational Therapist        Clinical Impression     Row Name 09/15/21 1323          Pain Assessment    Additional Documentation  Pain Scale: FACES Pre/Post-Treatment (Group)  -CS     Row Name 09/15/21 1323          Pain Scale: FACES Pre/Post-Treatment    Pain: FACES Scale, Pretreatment  0-->no hurt  -CS     Posttreatment Pain Rating  0-->no hurt  -CS     Pre/Posttreatment Pain Comment  no visual/verbal indications of pain during eval  -CS     Row Name 09/15/21 1323          Plan of Care Review    Plan of Care Reviewed With  patient  -CS     Progress  no change OT eval  -CS     Outcome  Summary  Initial OT evaluation completed. Pt presents w/ significant LUE weakness, decreased fxl endurance, and impaired balance, sensation and coordination warranting skilled IP OT services to promote return to PLOF. Pt received on BSC, required Kary and BUE support for transfer to bed, MaxA for toileting skills, MaxA for doffing socks, and Kary for return to supine. Reviewed safety awareness w/ LUE during mobility and ADL performance, demo'd understanding. Pt educated on R shoulder AROM and elbow AAROM HEP, tolerated 2/8reps, encouraged to perform outside of therapy. Recommend d/c to IRF.  -     Row Name 09/15/21 1323          Therapy Assessment/Plan (OT)    Patient/Family Therapy Goal Statement (OT)  return to baseline/home  -CS     Rehab Potential (OT)  good, to achieve stated therapy goals  -CS     Criteria for Skilled Therapeutic Interventions Met (OT)  meets criteria;skilled treatment is necessary  -CS     Therapy Frequency (OT)  daily  -CS     Row Name 09/15/21 1323          Therapy Plan Review/Discharge Plan (OT)    Equipment Needs Upon Discharge (OT)  shower chair  -CS     Anticipated Discharge Disposition (OT)  inpatient rehabilitation facility  -     Row Name 09/15/21 1323          Vital Signs    Pre Systolic BP Rehab  118 RN cleared for bedside eval, VSS on 2Lnc  -CS     Pre Treatment Diastolic BP  69  -CS     Post Systolic BP Rehab  121  -CS     Post Treatment Diastolic BP  74  -CS     Posttreatment Heart Rate (beats/min)  72  -CS     O2 Delivery Pre Treatment  nasal cannula  -CS     O2 Delivery Intra Treatment  nasal cannula  -CS     Post SpO2 (%)  92  -CS     O2 Delivery Post Treatment  nasal cannula  -CS     Pre Patient Position  Sitting  -CS     Intra Patient Position  Standing  -CS     Post Patient Position  Supine  -CS     Row Name 09/15/21 1323          Positioning and Restraints    Pre-Treatment Position  bedside commode  -CS     Post Treatment Position  bed  -CS     In Bed  notified  nsg;call light within reach;encouraged to call for assist;supine;fowlers;exit alarm on;with nsg  -CS       User Key  (r) = Recorded By, (t) = Taken By, (c) = Cosigned By    Initials Name Provider Type    James Soler OT Occupational Therapist        Outcome Measures     Row Name 09/15/21 1333          How much help from another is currently needed...    Putting on and taking off regular lower body clothing?  2  -CS     Bathing (including washing, rinsing, and drying)  2  -CS     Toileting (which includes using toilet bed pan or urinal)  2  -CS     Putting on and taking off regular upper body clothing  3  -CS     Taking care of personal grooming (such as brushing teeth)  3  -CS     Eating meals  3  -CS     AM-PAC 6 Clicks Score (OT)  15  -CS     Row Name 09/15/21 0800          How much help from another person do you currently need...    Turning from your back to your side while in flat bed without using bedrails?  3  -CA     Moving from lying on back to sitting on the side of a flat bed without bedrails?  3  -CA     Moving to and from a bed to a chair (including a wheelchair)?  3  -CA     Standing up from a chair using your arms (e.g., wheelchair, bedside chair)?  3  -CA     Climbing 3-5 steps with a railing?  2  -CA     To walk in hospital room?  3  -CA     AM-PAC 6 Clicks Score (PT)  17  -CA     Row Name 09/15/21 1333          Modified [place] Scale    Modified [place] Scale  3 - Moderate disability.  Requiring some help, but able to walk without assistance.  -     Row Name 09/15/21 1333          Functional Assessment    Outcome Measure Options  AM-PAC 6 Clicks Daily Activity (OT);Modified [place]  -CS       User Key  (r) = Recorded By, (t) = Taken By, (c) = Cosigned By    Initials Name Provider Type    Ruy Ortiz, RN Registered Nurse    James Soler OT Occupational Therapist          Occupational Therapy Education                 Title: PT OT SLP Therapies (Done)     Topic: Occupational  Therapy (Done)     Point: ADL training (Done)     Description:   Instruct learner(s) on proper safety adaptation and remediation techniques during self care or transfers.   Instruct in proper use of assistive devices.              Learning Progress Summary           Patient Acceptance, E,D, VU,NR by  at 9/15/2021 1334                   Point: Home exercise program (Done)     Description:   Instruct learner(s) on appropriate technique for monitoring, assisting and/or progressing therapeutic exercises/activities.              Learning Progress Summary           Patient Acceptance, E,D, VU,NR by  at 9/15/2021 1334                   Point: Precautions (Done)     Description:   Instruct learner(s) on prescribed precautions during self-care and functional transfers.              Learning Progress Summary           Patient Acceptance, E,D, VU,NR by  at 9/15/2021 1334                   Point: Body mechanics (Done)     Description:   Instruct learner(s) on proper positioning and spine alignment during self-care, functional mobility activities and/or exercises.              Learning Progress Summary           Patient Acceptance, E,D, VU,NR by  at 9/15/2021 1334                               User Key     Initials Effective Dates Name Provider Type Discipline     06/16/21 -  James Glover OT Occupational Therapist OT              OT Recommendation and Plan  Therapy Frequency (OT): daily  Plan of Care Review  Plan of Care Reviewed With: patient  Progress: no change (OT eval)  Outcome Summary: Initial OT evaluation completed. Pt presents w/ significant LUE weakness, decreased fxl endurance, and impaired balance, sensation and coordination warranting skilled IP OT services to promote return to PLOF. Pt received on BSC, required Kary and BUE support for transfer to bed, MaxA for toileting skills, MaxA for doffing socks, and Kary for return to supine. Reviewed safety awareness w/ LUE during mobility and ADL  performance, demo'd understanding. Pt educated on R shoulder AROM and elbow AAROM HEP, tolerated 2/8reps, encouraged to perform outside of therapy. Recommend d/c to IRF.     Time Calculation:   Time Calculation- OT     Row Name 09/15/21 1240             Time Calculation- OT    OT Start Time  1106  -CS      OT Received On  09/15/21  -CS      OT Goal Re-Cert Due Date  09/25/21  -CS         Timed Charges    86230 - OT Therapeutic Exercise Minutes  8  -CS         Untimed Charges    OT Eval/Re-eval Minutes  37  -CS         Total Minutes    Timed Charges Total Minutes  8  -CS      Untimed Charges Total Minutes  37  -CS       Total Minutes  45  -CS        User Key  (r) = Recorded By, (t) = Taken By, (c) = Cosigned By    Initials Name Provider Type    CS James Glover OT Occupational Therapist        Therapy Charges for Today     Code Description Service Date Service Provider Modifiers Qty    [id number]  OT THER PROC EA 15 MIN 9/15/2021 James Glover OT GO 1    [id number]  OT EVAL LOW COMPLEXITY 3 9/15/2021 James Glover OT GO 1               James Glover OT  9/15/2021

## 2021-09-15 NOTE — PLAN OF CARE
Goal Outcome Evaluation:  Plan of Care Reviewed With: patient           Outcome Summary: PT PRESENTS WITH L UE WEAKNESS (SEE O.TSam YOUNG) , IMPAIRED BALANCE AND DELCINE IN FUNCTIONAL MOBILITY FROM PLOF. AMBULATED 180 FEET WITH CANE AND CGA. RECOMMEND IRF FOR RETURN TO MAX LEVEL OF FUNCTION PRIOR TO HOME. PT MOTIVATED TO WORK WITH THERAPY. ISSUED CANE FOR D/C.

## 2021-09-15 NOTE — PROGRESS NOTES
HEPARIN INFUSION  Karyna Santiago is a  71 y.o. female receiving heparin infusion.             Therapy for (VTE/Cardiac):   vte  Patient Weight: 55.2 kg  Initial Bolus (Y/N):  yes   Any Bolus (Y/N):   yes        Signs or Symptoms of Bleeding:         Recommend Xa every 6 hours.     VTE (PE/DVT)   Initial Bolus: 80 units/kg (Max 10,000 units)  Initial rate: 18 units/kg/hr (Max 1,500 units/hr)      (Anti-Xa) (IU/mL) Bolus Dose Stop Infusion Rate Change Repeat (Anti-Xa)     ? 0.19 80 units/kg 0 hrs Increase rate by   4 units/kg/hr 6 hrs      0.2 - 0.29 40 units/kg 0 hrs Increase rate by 2 units/kg/hr 6 hrs    0.3 - 0.7  0 0 hrs No change 6 hrs      0.71 - 0.99   0  0 hrs Decrease rate by 2 units/kg/hr 6 hrs      ? 1 0 Hold 1 hr Decrease rate by 3 units/kg/hr 6 hrs            Results from last 7 days   Lab Units 09/15/21  1211 09/14/21  0833   HEMOGLOBIN g/dL 13.2 13.7   HEMATOCRIT % 44.2 44.3   PLATELETS 10*3/mm3 263 233          Date   Time   Anti-Xa Current Rate (Unit/kg/hr) Bolus   (Units) Rate Change   (Unit/kg/hr) New Rate (Unit/kg/hr) Next   Anti-Xa Comments  Pump Check Daily   9.15 1900 pend new pend  18 0100 S/w  omra [facility], [facility]  9/15/2021  18:29 EDT

## 2021-09-15 NOTE — CASE MANAGEMENT/SOCIAL WORK
Continued Stay Note  [facility]     Patient Name: Karyna Santiago  MRN: [id number]  Today's Date: 9/15/2021    Admit Date: 9/14/2021    Discharge Plan     Row Name 09/15/21 0912       Plan    Plan  Home with family    Patient/Family in Agreement with Plan  yes    Plan Comments  Spoke to patient at bedside. Plan is home with  pending PT/OT recs. CM will continue to follow.    Final Discharge Disposition Code  01 - home or self-care        Discharge Codes    No documentation.             Robert Bullock RN

## 2021-09-15 NOTE — PROGRESS NOTES
NEUROLOGY DAILY PROGRESS NOTE    NAME: Karyna Santiago  : 1950  MRN: [id number]      LOS: 1 day     PROVIDER OF SERVICE: Crissy Malone MD    Chief Complaint: Stroke (CMS/HCC)    Subjective:   Patient Seen At: 1015    Interval History:  History taken from: patient chart  Patient Complaints: None    Interval Hx:  Patient sitting comfortably in bed. States is doing well and much better than yesterday. Is eager to get out of bed and start moving around more. Still has some left sided weakness in the UE, but has noticed slight improvement.  is very tired and would like to sleep some more today.   Patient counseled on importance of smoking cessation being the most important modifiable risk factor, and discussed importance of medication adherence for stroke prevention.     Review of Systems:   Review of Systems   Constitutional: Negative for chills and fever.   HENT: Negative for rhinorrhea, sore throat and trouble swallowing.    Eyes: Negative for photophobia and visual disturbance.   Respiratory: Negative for cough and shortness of breath.    Cardiovascular: Negative for chest pain and palpitations.   Gastrointestinal: Negative for abdominal pain and nausea.   Genitourinary: Positive for flank pain (  Left sided likely from rib fractures from bull trampling). Negative for difficulty urinating.   Musculoskeletal: Negative for arthralgias and back pain.   Skin: Positive for wound (  Left LE medial tibial).   Neurological: Positive for weakness (  Left UE). Negative for dizziness and headaches.   Psychiatric/Behavioral: Negative for confusion. The patient is not nervous/anxious.        Objective:     Vital Signs  Temp:  [97.7 °F (36.5 °C)-98.6 °F (37 °C)] 98 °F (36.7 °C)  Heart Rate:  [] 71  Resp:  [16-18] 16  BP: (114-128)/(64-82) 118/69  Body mass index is 22.25 kg/m².    Physical Exam  Physical Exam  Vitals reviewed.   Constitutional:       General: She is not in acute distress.     Appearance:  Normal appearance. She is well-developed, well-groomed and normal weight.      Interventions: Nasal cannula in place.      Comments: 2L   HENT:      Head: Normocephalic.      Right Ear: Hearing and external ear normal.      Left Ear: Hearing and external ear normal.      Nose: Nose normal.      Mouth/Throat:      Lips: Pink.      Mouth: Mucous membranes are moist.      Tongue: No lesions. Tongue does not deviate from midline.      Pharynx: Oropharynx is clear. Uvula midline.   Eyes:      General: Lids are normal.      Extraocular Movements: Extraocular movements intact.      Conjunctiva/sclera: Conjunctivae normal.      Pupils: Pupils are equal, round, and reactive to light.   Cardiovascular:      Rate and Rhythm: Normal rate and regular rhythm.      Pulses:           Dorsalis pedis pulses are 2+ on the right side and 2+ on the left side.   Pulmonary:      Effort: Pulmonary effort is normal.   Musculoskeletal:      Cervical back: Neck supple.      Right lower leg: No edema.      Left lower leg: No edema.   Skin:     General: Skin is warm.   Neurological:      Mental Status: She is alert and oriented to person, place, and time.      GCS: GCS eye subscore is 4. GCS verbal subscore is 5. GCS motor subscore is 6.      Cranial Nerves: Cranial nerves are intact. No dysarthria or facial asymmetry.      Sensory: Sensation is intact.      Motor: Weakness (  Left UE) and abnormal muscle tone (  Left UE) present.   Psychiatric:         Attention and Perception: Attention and perception normal.         Mood and Affect: Mood and affect normal.         Speech: Speech normal.         Behavior: Behavior is cooperative.         Cognition and Memory: Cognition and memory normal.         Medication Review    Current Facility-Administered Medications:   •  acetaminophen (TYLENOL) tablet 650 mg, 650 mg, Oral, Q4H PRN **OR** acetaminophen (TYLENOL) 160 MG/5ML solution 650 mg, 650 mg, Oral, Q4H PRN **OR** acetaminophen (TYLENOL)  suppository 650 mg, 650 mg, Rectal, Q4H PRN, Antoinette Cho MD  •  aspirin chewable tablet 81 mg, 81 mg, Oral, Daily, 81 mg at 09/15/21 0845 **OR** aspirin suppository 300 mg, 300 mg, Rectal, Daily, Antoinette Cho MD  •  atorvastatin (LIPITOR) tablet 80 mg, 80 mg, Oral, Nightly, Antoinette Cho MD, 80 mg at 09/14/21 2123  •  citalopram (CeleXA) tablet 20 mg, 20 mg, Oral, Daily, Antoinette Cho MD, 20 mg at 09/15/21 0845  •  clopidogrel (PLAVIX) tablet 75 mg, 75 mg, Oral, Daily, Antoinette Cho MD, 75 mg at 09/15/21 0845  •  heparin (porcine) 5000 UNIT/ML injection 5,000 Units, 5,000 Units, Subcutaneous, Q8H, Antoinette Cho MD, 5,000 Units at 09/15/21 0435  •  influenza vac split quad (FLUZONE,FLUARIX,AFLURIA,FLULAVAL) injection 0.5 mL, 0.5 mL, Intramuscular, During Hospitalization, Antoinette Cho MD  •  nicotine (NICODERM CQ) 21 MG/24HR patch 1 patch, 1 patch, Transdermal, Q24H, Antoinette Cho MD, 1 patch at 09/15/21 0846  •  ondansetron (ZOFRAN) tablet 4 mg, 4 mg, Oral, Q6H PRN **OR** ondansetron (ZOFRAN) injection 4 mg, 4 mg, Intravenous, Q6H PRN, Antoinette Cho MD  •  oxybutynin (DITROPAN) tablet 5 mg, 5 mg, Oral, TID, Antoinette Cho MD, 5 mg at 09/15/21 0845  •  potassium chloride (MICRO-K) CR capsule 40 mEq, 40 mEq, Oral, PRN, 40 mEq at 09/14/21 2123 **OR** potassium chloride (KLOR-CON) packet 40 mEq, 40 mEq, Oral, PRN **OR** potassium chloride 10 mEq in 100 mL IVPB, 10 mEq, Intravenous, Q1H PRN, Antoinette Cho MD  •  sodium chloride 0.9 % flush 1-10 mL, 1-10 mL, Intravenous, PRN, Antoinette Cho MD  •  sodium chloride 0.9 % flush 10 mL, 10 mL, Intravenous, Q12H, Antoinette Cho MD, 10 mL at 09/15/21 0845  •  sodium chloride 0.9 % flush 10 mL, 10 mL, Intravenous, PRN, Antoinette Cho MD  •  sodium chloride 0.9 % flush 10 mL, 10 mL, Intravenous, Q12H, Antoinette Cho MD  •  traMADol (ULTRAM) tablet  100 mg, 100 mg, Oral, Q6H PRN, Antoinette Cho MD, 100 mg at 09/15/21 0432     Diagnostic Data    Lab Results (last 24 hours)     Procedure Component Value Units Date/Time    POC Glucose Once [[id number]]  (Normal) Collected: 09/15/21 0502    Specimen: Blood Updated: 09/15/21 0509     Glucose 108 mg/dL      Comment: Meter: [id number] : [id number] Gabriele Iniguez       Potassium [[id number]]  (Normal) Collected: 09/14/21 2349    Specimen: Blood Updated: 09/15/21 0100     Potassium 4.2 mmol/L     POC Glucose Once [[id number]]  (Normal) Collected: 09/14/21 2328    Specimen: Blood Updated: 09/14/21 2330     Glucose 82 mg/dL      Comment: Meter: [id number] : [id number] Gabriele Iniguez       POC Glucose Once [[id number]]  (Normal) Collected: 09/14/21 1715    Specimen: Blood Updated: 09/14/21 1716     Glucose 114 mg/dL      Comment: Meter: [id number] : [id number] Amador Reis       Urinalysis, Microscopic Only - Urine, Clean Catch [[id number]]  (Abnormal) Collected: 09/14/21 1615    Specimen: Urine, Clean Catch Updated: 09/14/21 1656     RBC, UA 0-2 /HPF      WBC, UA 0-2 /HPF      Bacteria, UA 4+ /HPF      Squamous Epithelial Cells, UA 0-2 /HPF      Hyaline Casts, UA None Seen /LPF      Methodology Automated Microscopy    Urinalysis With Culture If Indicated - Urine, Clean Catch [[id number]]  (Abnormal) Collected: 09/14/21 1615    Specimen: Urine, Clean Catch Updated: 09/14/21 1656     Color, UA Dark Yellow     Appearance, UA Clear     pH, UA 5.5     Specific Gravity, UA 1.061     Glucose, UA Negative     Ketones, UA Negative     Bilirubin, UA Small (1+)     Blood, UA Negative     Protein, UA Negative     Leuk Esterase, UA Negative     Nitrite, UA Positive     Urobilinogen, UA 1.0 E.U./dL    Folate [[id number]]  (Normal) Collected: 09/14/21 0954    Specimen: Blood Updated: 09/14/21 1538     Folate >20.00 ng/mL     Narrative:      Results may be falsely increased if patient taking Biotin.      Vitamin B12  [[id number]]  (Abnormal) Collected: 09/14/21 0954    Specimen: Blood Updated: 09/14/21 1538     Vitamin B-12 1,160 pg/mL     Narrative:      Results may be falsely increased if patient taking Biotin.              I reviewed the patient's new clinical results.  I reviewed the patient's new imaging results and agree with the interpretation.  Discussed with Dr. Collier    Assessment/Plan:     Active Hospital Problems    Diagnosis  POA   • **Stroke (CMS/HCC) [I63.9]  Yes     · Off bedrest  · PT/OT  · Continue DAPT  · Continue Atrovastatin 80mg  · TTE showed + PFO, will obtain bilateral LE U/S r/o DVT     • Essential hypertension [I10]  Unknown   • Tobacco abuse [Z72.0]  Unknown     · Smoking cessation counseling provided and recommended for stroke prevention  · Nicotine patch     • Hypokalemia [E87.6]  Unknown       DVT prophylaxis:   Mechanical Order History:      Ordered        09/14/21 0832  Place Sequential Compression Device  Once         09/14/21 0832  Maintain Sequential Compression Device  Continuous                 Pharmalogical Order History:      Ordered     Dose Route Frequency Stop    09/14/21 0958  heparin (porcine) 5000 UNIT/ML injection 5,000 Units      5,000 Units SC Every 8 Hours Scheduled --               Code status is   Code Status and Medical Interventions:   Ordered at: 09/14/21 0958     Level Of Support Discussed With:    Patient     Code Status:    CPR     Medical Interventions (Level of Support Prior to Arrest):    Full       Plan for disposition:Where: home health and When:  3 days      Time: 20 minutes        This document has been electronically signed by Crissy Malone MD on September 15, 2021 10:06 EDT     Part of this note may be an electronic transcription/translation of spoken language to printed text using the Dragon Dictation System.

## 2021-09-15 NOTE — THERAPY EVALUATION
Patient Name: Karyna Santiago  : 1950    MRN: [id number]                              Today's Date: 9/15/2021       Admit Date: 2021    Visit Dx:     ICD-10-CM ICD-9-CM   1. Cerebrovascular accident (CVA), unspecified mechanism (CMS/HCC)  I63.9 434.91   2. Dysarthria  R47.1 784.51   3. Cognitive communication deficit  R41.841 799.52   4. Dysphagia, unspecified type  R13.10 787.20     Patient Active Problem List   Diagnosis   • Herniation of lumbar intervertebral disc   • Stroke (CMS/HCC)   • Essential hypertension   • Tobacco abuse   • Hypokalemia     Past Medical History:   Diagnosis Date   • Anemia    • Arthritis    • Blood disease    • Headache    • History of transfusion    • Hypertension      Past Surgical History:   Procedure Laterality Date   • GASTRIC BYPASS       General Information     Row Name 09/15/21 1447          Physical Therapy Time and Intention    Document Type  evaluation  -CD     Mode of Treatment  physical therapy  -CD     Row Name 09/15/21 1447          General Information    Patient Profile Reviewed  yes  -CD     Prior Level of Function  independent:;all household mobility;community mobility Pt reports Independent ADL performance, RW/SPC prn around the house, reports no AD use in garden/lawn, report using walk-in shower in basement (step-in tub on main floor), no bath seating reported  -CD     Existing Precautions/Restrictions  fall;oxygen therapy device and L/min L UE WEAKNESS.  -CD     Barriers to Rehab  medically complex  -CD     Row Name 09/15/21 1447          Living Environment    Lives With  spouse  -CD     Row Name 09/15/21 1447          Home Main Entrance    Number of Stairs, Main Entrance  two  -CD     Stair Railings, Main Entrance  none  -CD     Row Name 09/15/21 1447          Stairs Within Home, Primary    Stairs, Within Home, Primary  Two level home over basement w/ flight(s) to 2nd floor and basement, walk-in shower located in basement, step-in tub on main floor, pull-up  bar at commode, no bath seating reported  -CD     Stair Railings, Within Home, Primary  railing on left side (ascending)  -CD     Row Name 09/15/21 1447          Cognition    Orientation Status (Cognition)  oriented x 3  -CD     Row Name 09/15/21 1447          Safety Issues, Functional Mobility    Safety Issues Affecting Function (Mobility)  insight into deficits/self-awareness;safety precaution awareness;safety precautions follow-through/compliance  -CD     Impairments Affecting Function (Mobility)  balance;endurance/activity tolerance;grasp;motor control;motor planning;strength;muscle tone abnormal  -CD       User Key  (r) = Recorded By, (t) = Taken By, (c) = Cosigned By    Initials Name Provider Type    CD Anay Klein PT Physical Therapist        Mobility     Row Name 09/15/21 1450          Bed Mobility    Bed Mobility  supine-sit  -CD     Supine-Sit [place] (Bed Mobility)  standby assist  -CD     Assistive Device (Bed Mobility)  head of bed elevated  -CD     Comment (Bed Mobility)  INCREASED TIME AND EFFORT. CARE TO PROTECT WEAK L UE.  -CD     Row Name 09/15/21 1450          Transfers    Comment (Transfers)  CUES FOR HAND PLACEMENT. STS INITIALLY WITH ONLY CGA AT GAIT BELT BUT GIVEN SUPPORT FOR R UE TO BALANCE ONCE STANDING. STS FROM EOB AND COMMODE. DEPENDENT WITH HYGIENE DUE TO L HAND WEAKNESS AND R UE LIMITED WRIST ROM.  -CD     Row Name 09/15/21 1450          Sit-Stand Transfer    Sit-Stand [place] (Transfers)  contact guard;verbal cues  -CD     Row Name 09/15/21 1450          Gait/Stairs (Locomotion)    [place] Level (Gait)  contact guard  -CD     Assistive Device (Gait)  cane, straight  -CD     Distance in Feet (Gait)  180 FEET  -CD     Deviations/Abnormal Patterns (Gait)  gait speed decreased;david decreased  -CD     Bilateral Gait Deviations  forward flexed posture;heel strike decreased  -CD     Comment (Gait/Stairs)  NO OVERT LOB WITH CANE BUT MILDLY UNSTEADY.  -CD       User Key   (r) = Recorded By, (t) = Taken By, (c) = Cosigned By    Initials Name Provider Type    CD Anay Klein PT Physical Therapist        Obj/Interventions     Row Name 09/15/21 1452          Range of Motion Comprehensive    General Range of Motion  bilateral lower extremity ROM WNL  -CD     Row Name 09/15/21 1452          Strength Comprehensive (MMT)    Comment, General Manual Muscle Testing (MMT) Assessment  B LE GROSSLY 4+/5 AND SYMMETRICAL.  -CD     Row Name 09/15/21 1452          Motor Skills    Motor Skills  coordination;functional endurance  -CD     Coordination  gross motor deficit;bilateral;lower extremity;WFL  -CD     Functional Endurance  REQUIRING O2 AT 2L- SATS STABLE WITH ACTIVITY ON 2L.  -CD     Row Name 09/15/21 1452          Balance    Balance Assessment  sitting static balance;sitting dynamic balance;standing static balance;standing dynamic balance  -CD     Static Sitting Balance  supported;sitting in chair  -CD     Dynamic Sitting Balance  WFL;supported;sitting in chair  -CD     Static Standing Balance  mild impairment;supported;standing  -CD     Dynamic Standing Balance  mild impairment;supported;standing  -CD     Comment, Balance  CGA FOR GAIT IN [place].  -CD     Row Name 09/15/21 1452          Sensory Assessment (Somatosensory)    Sensory Assessment (Somatosensory)  sensation intact B LE'S  -CD       User Key  (r) = Recorded By, (t) = Taken By, (c) = Cosigned By    Initials Name Provider Type    CD Anay Klein PT Physical Therapist        Goals/Plan     Row Name 09/15/21 4021          Bed Mobility Goal 1 (PT)    Activity/Assistive Device (Bed Mobility Goal 1, PT)  bed mobility activities, all  -CD     [place] Level/Cues Needed (Bed Mobility Goal 1, PT)  independent  -CD     Time Frame (Bed Mobility Goal 1, PT)  long term goal (LTG);2 weeks  -CD     Row Name 09/15/21 6242          Transfer Goal 1 (PT)    Activity/Assistive Device (Transfer Goal 1, PT)   sit-to-stand/stand-to-sit;bed-to-chair/chair-to-bed;cane, straight  -CD     [place] Level/Cues Needed (Transfer Goal 1, PT)  independent  -CD     Time Frame (Transfer Goal 1, PT)  long term goal (LTG);2 weeks  -CD     Row Name 09/15/21 1501          Gait Training Goal 1 (PT)    Activity/Assistive Device (Gait Training Goal 1, PT)  gait (walking locomotion);cane, straight  -CD     [place] Level (Gait Training Goal 1, PT)  independent  -CD     Distance (Gait Training Goal 1, PT)  500 FEET  -CD     Time Frame (Gait Training Goal 1, PT)  long term goal (LTG);2 weeks  -CD     Row Name 09/15/21 8560          Stairs Goal 1 (PT)    Activity/Assistive Device (Stairs Goal 1, PT)  ascending stairs;descending stairs;using handrail, right  -CD     [place] Level/Cues Needed (Stairs Goal 1, PT)  contact guard assist  -CD     Number of Stairs (Stairs Goal 1, PT)  12  -CD     Time Frame (Stairs Goal 1, PT)  long term goal (LTG);2 weeks  -CD       User Key  (r) = Recorded By, (t) = Taken By, (c) = Cosigned By    Initials Name Provider Type    CD Anay Klein, PT Physical Therapist        Clinical Impression     Row Name 09/15/21 8005          Pain Scale: Numbers Pre/Post-Treatment    Pretreatment Pain Rating  0/10 - no pain  -CD     Posttreatment Pain Rating  0/10 - no pain  -CD     Row Name 09/15/21 0573          Plan of Care Review    Plan of Care Reviewed With  patient  -CD     Outcome Summary  PT PRESENTS WITH L UE WEAKNESS (SEE O.T. EVAL) , IMPAIRED BALANCE AND DELCINE IN FUNCTIONAL MOBILITY FROM PLOF. AMBULATED 180 FEET WITH CANE AND CGA. RECOMMEND IRF FOR RETURN TO MAX LEVEL OF FUNCTION PRIOR TO HOME. PT MOTIVATED TO WORK WITH THERAPY. ISSUED CANE FOR D/C.  -CD     Row Name 09/15/21 8972          Therapy Assessment/Plan (PT)    Patient/Family Therapy Goals Statement (PT)  TO RETURN TO PLOF.  -CD     Rehab Potential (PT)  good, to achieve stated therapy goals  -CD     Criteria for Skilled Interventions Met (PT)   yes  -CD     Predicted Duration of Therapy Intervention (PT)  2 WEEKS  -CD     Row Name 09/15/21 1455          Vital Signs    Pre Systolic BP Rehab  121  -CD     Pre Treatment Diastolic BP  74  -CD     Post Systolic BP Rehab  113  -CD     Post Treatment Diastolic BP  74  -CD     Posttreatment Heart Rate (beats/min)  73  -CD     Posttreatment Resp Rate (breaths/min)  75  -CD     Pre SpO2 (%)  93  -CD     O2 Delivery Pre Treatment  supplemental O2  -CD     O2 Delivery Intra Treatment  supplemental O2  -CD     Post SpO2 (%)  95  -CD     O2 Delivery Post Treatment  supplemental O2  -CD     Pre Patient Position  Supine  -CD     Intra Patient Position  Standing  -CD     Post Patient Position  Sitting  -CD     Row Name 09/15/21 1455          Positioning and Restraints    Pre-Treatment Position  in bed  -CD     Post Treatment Position  chair  -CD     In Chair  notified nsg;call light within reach;encouraged to call for assist;exit alarm on;legs elevated;waffle cushion  -CD       User Key  (r) = Recorded By, (t) = Taken By, (c) = Cosigned By    Initials Name Provider Type    Anay Laird, PT Physical Therapist        Outcome Measures     Row Name 09/15/21 1508 09/15/21 0800       How much help from another person do you currently need...    Turning from your back to your side while in flat bed without using bedrails?  4  -CD  3  -CA    Moving from lying on back to sitting on the side of a flat bed without bedrails?  4  -CD  3  -CA    Moving to and from a bed to a chair (including a wheelchair)?  3  -CD  3  -CA    Standing up from a chair using your arms (e.g., wheelchair, bedside chair)?  3  -CD  3  -CA    Climbing 3-5 steps with a railing?  3  -CD  2  -CA    To walk in hospital room?  3  -CD  3  -CA    AM-PAC 6 Clicks Score (PT)  20  -CD  17  -CA    Row Name 09/15/21 1508 09/15/21 1333       Modified [place] Scale    Modified [place] Scale  3 - Moderate disability.  Requiring some help, but able to walk without  assistance.  -CD  3 - Moderate disability.  Requiring some help, but able to walk without assistance.  -    Row Name 09/15/21 1333          Functional Assessment    Outcome Measure Options  AM-PAC 6 Clicks Daily Activity (OT);Modified [place]  -CS       User Key  (r) = Recorded By, (t) = Taken By, (c) = Cosigned By    Initials Name Provider Type    CD Anay Klein, PT Physical Therapist    Ruy Ortiz RN Registered Nurse    James Soler, OT Occupational Therapist                       Physical Therapy Education                 Title: PT OT SLP Therapies (Done)     Topic: Physical Therapy (Done)     Point: Mobility training (Done)     Learning Progress Summary           Patient Acceptance, E, VU,NR by CD at 9/15/2021 1510    Comment: BENEFITS OF OOB ACTIVITY, PROGRESSION OF POC, SAFETY WITH MOBILITY, D/C PLANNING,                   Point: Home exercise program (Done)     Learning Progress Summary           Patient Acceptance, E, VU,NR by CD at 9/15/2021 1510    Comment: BENEFITS OF OOB ACTIVITY, PROGRESSION OF POC, SAFETY WITH MOBILITY, D/C PLANNING,                   Point: Body mechanics (Done)     Learning Progress Summary           Patient Acceptance, E, VU,NR by CD at 9/15/2021 1510    Comment: BENEFITS OF OOB ACTIVITY, PROGRESSION OF POC, SAFETY WITH MOBILITY, D/C PLANNING,                   Point: Precautions (Done)     Learning Progress Summary           Patient Acceptance, E, VU,NR by CD at 9/15/2021 1510    Comment: BENEFITS OF OOB ACTIVITY, PROGRESSION OF POC, SAFETY WITH MOBILITY, D/C PLANNING,                               User Key     Initials Effective Dates Name Provider Type Discipline     06/16/21 -  Anay Klein, PT Physical Therapist PT              PT Recommendation and Plan  Planned Therapy Interventions (PT): balance training, bed mobility training, gait training, home exercise program, transfer training, strengthening, stair training  Plan of Care Reviewed With:  patient  Outcome Summary: PT PRESENTS WITH L UE WEAKNESS (SEE O.T. EVAL) , IMPAIRED BALANCE AND DELCINE IN FUNCTIONAL MOBILITY FROM PLOF. AMBULATED 180 FEET WITH CANE AND CGA. RECOMMEND IRF FOR RETURN TO MAX LEVEL OF FUNCTION PRIOR TO HOME. PT MOTIVATED TO WORK WITH THERAPY. ISSUED CANE FOR D/C.     Time Calculation:   PT Charges     Row Name 09/15/21 1511             Time Calculation    Start Time  1355  -CD      PT Received On  09/15/21  -CD      PT Goal Re-Cert Due Date  09/25/21  -CD         Time Calculation- PT    Total Timed Code Minutes- PT  15 minute(s)  -CD         Timed Charges    06052 - Gait Training Minutes   15  -CD         Untimed Charges    PT Eval/Re-eval Minutes  61  -CD         Total Minutes    Timed Charges Total Minutes  15  -CD      Untimed Charges Total Minutes  61  -CD       Total Minutes  76  -CD        User Key  (r) = Recorded By, (t) = Taken By, (c) = Cosigned By    Initials Name Provider Type    CD Anay Klein, PT Physical Therapist        Therapy Charges for Today     Code Description Service Date Service Provider Modifiers Qty    75391286041 HC GAIT TRAINING EA 15 MIN 9/15/2021 Anay Klein, PT GP 1    1949198 HC PT EVAL LOW COMPLEXITY 4 9/15/2021 Anay Klein, PT GP 1          PT G-Codes  Outcome Measure Options: AM-PAC 6 Clicks Daily Activity (OT), Modified [place]  AM-PAC 6 Clicks Score (PT): 20  AM-PAC 6 Clicks Score (OT): 15  Modified [place] Scale: 3 - Moderate disability.  Requiring some help, but able to walk without assistance.    Anay Klein, PT  9/15/2021

## 2021-09-15 NOTE — PROGRESS NOTES
Malnutrition Severity Assessment    Patient Name:  Karyna Santiago  YOB: 1950  MRN: [id number]  Admit Date:  9/14/2021    Patient meets criteria for : Moderate (non-severe) Malnutrition (Pt meets criteria for moderate (non-severe) malnutrition in the context of chronic illness as evidenced by moderate subcutaneous fat loss and severe muscle loss per NFPE)    Malnutrition Severity Assessment  Malnutrition Type: Chronic Disease - Related Malnutrition     Malnutrition Type (last 8 hours)      Malnutrition Severity Assessment     Row Name 09/15/21 1254       Malnutrition Severity Assessment    Malnutrition Type  Chronic Disease - Related Malnutrition    Row Name 09/15/21 1254       Muscle Loss    Loss of Muscle Mass Findings  Moderate    Temple Region  Severe - deep hollowing/scooping, lack of muscle to touch, facial bones well defined    Clavicle Bone Region  Severe - protruding prominent bone    Acromion Bone Region  Moderate - acromion may slightly protrude    Row Name 09/15/21 1254       Fat Loss    Subcutaneous Fat Loss Findings  Moderate    Orbital Region   Moderate -  somewhat hollowness, slightly dark circles    Upper Arm Region  Moderate - some fat tissue, not ample    Row Name 09/15/21 1254       Criteria Met (Must meet criteria for severity in at least 2 of these categories: M Wasting, Fat Loss, Fluid, Secondary Signs, Wt. Status, Intake)    Patient meets criteria for   Moderate (non-severe) Malnutrition Pt meets criteria for moderate (non-severe) malnutrition in the context of chronic illness as evidenced by moderate subcutaneous fat loss and severe muscle loss per NFPE          Electronically signed by:  Edmond Velasco RD  09/15/21 12:55 EDT

## 2021-09-15 NOTE — PLAN OF CARE
Goal Outcome Evaluation:  Plan of Care Reviewed With: patient        Progress: improving  Outcome Summary: A&Ox4, VSS, 2LNC. NIH 2- facial droop and LUE drift. Mobility improving, ambulated with PT, see notes. Lower extremity duplex completed this shift. No c/o pain or further needs at this time.

## 2021-09-15 NOTE — PROGRESS NOTES
[facility] Medicine Services  PROGRESS NOTE    Patient Name: Karyna Santiago  : 1950  MRN: [id number]    Date of Admission: 2021  Primary Care Physician: Mason Miranda MD    Subjective   Subjective     CC:  stroke    HPI:  Left arm a bit weak.  Has been smoking at home just prior to admission    ROS:  Gen- No fevers, chills  CV- No chest pain, palpitations  Resp- No cough, dyspnea  GI- No N/V/D, abd pain        Objective   Objective     Vital Signs:   Temp:  [97.7 °F (36.5 °C)-98.6 °F (37 °C)] 98.1 °F (36.7 °C)  Heart Rate:  [70-94] 83  Resp:  [16] 16  BP: (113-121)/(64-74) 113/74  Flow (L/min):  [2] 2     Physical Exam:  Constitutional - no acute distress, frail, in bed  HEENT-NCAT, mucous membranes moist  CV-RRR, S1 S2 normal, no m/r/g  Resp-CTAB, no wheezes, rhonchi or rales  Abd-soft, nontender, nondistended, normoactive bowel sounds  Ext-No lower extremity cyanosis, clubbing or edema bilaterally  Neuro-alert, speech clear, moves all extremities   Psych-normal affect   Skin- No rash on exposed UE or LE bilaterally      Results Reviewed:  LAB RESULTS:      Lab 09/15/21  1828 09/15/21  1211 09/14/21  0833   WBC 6.57 7.34 6.87   HEMOGLOBIN 13.0 13.2 13.7   HEMATOCRIT 41.4 44.2 44.3   PLATELETS 260 263 233   NEUTROS ABS 3.62  --  4.85   IMMATURE GRANS (ABS) 0.06*  --  0.02   LYMPHS ABS 1.84  --  1.23   MONOS ABS 0.87  --  0.66   EOS ABS 0.13  --  0.06   MCV 95.0 100.2* 95.1   PROTIME 13.1  --   --    APTT 28.1*  --   --    HEPARIN ANTI-XA 0.10*  --   --          Lab 09/15/21  1211 21  2349 21  0918 21  0833   SODIUM 139  --  137  --    POTASSIUM 3.9 4.2 3.1*  --    CHLORIDE 107  --  101  --    CO2 22.0  --  27.0  --    ANION GAP 10.0  --  9.0  --    BUN 13  --  13  --    CREATININE 0.61  --  0.62  --    GLUCOSE 97  --  98  --    CALCIUM 9.0  --  8.6  --    HEMOGLOBIN A1C  --   --   --  5.40   TSH  --   --  0.567  --          Lab 21  0918   TOTAL PROTEIN  5.3*   ALBUMIN 3.00*   GLOBULIN 2.3   ALT (SGPT) 7   AST (SGOT) 13   BILIRUBIN 0.6   ALK PHOS 57         Lab 09/15/21  1828   PROTIME 13.1   INR 1.02         Lab 09/14/21  0918   CHOLESTEROL 134   LDL CHOL 59   HDL CHOL 63*   TRIGLYCERIDES 58         Lab 09/14/21  0954   FOLATE >20.00   VITAMIN B 12 1,160*         Brief Urine Lab Results  (Last result in the past 365 days)      Color   Clarity   Blood   Leuk Est   Nitrite   Protein   CREAT   Urine HCG        09/14/21 1615 Dark Yellow Clear Negative Negative Positive Negative               Microbiology Results Abnormal     Procedure Component Value - Date/Time    COVID PRE-OP / PRE-PROCEDURE SCREENING ORDER (NO ISOLATION) - Swab, Nasopharynx [[id number]]  (Normal) Collected: 09/14/21 1113    Lab Status: Final result Specimen: Swab from Nasopharynx Updated: 09/14/21 1247    Narrative:      The following orders were created for panel order COVID PRE-OP / PRE-PROCEDURE SCREENING ORDER (NO ISOLATION) - Swab, Nasopharynx.  Procedure                               Abnormality         Status                     ---------                               -----------         ------                     COVID-19,CEPHEID/CHAS,LE...[[id number]]  Normal              Final result                 Please view results for these tests on the individual orders.    COVID-19,CEPHEID/CHAS,[facility] IN-HOUSE(OR EMERGENT/ADD-ON),NP SWAB IN TRANSPORT MEDIA 3-4 HR TAT - Swab, Nasopharynx [[id number]]  (Normal) Collected: 09/14/21 1113    Lab Status: Final result Specimen: Swab from Nasopharynx Updated: 09/14/21 1247     COVID19 Not Detected    Narrative:      Fact sheet for providers: https://www.fda.gov/media/677835/download     Fact sheet for patients: https://www.fda.gov/media/692781/download  Fact sheet for providers: https://www.fda.gov/media/728585/download    Fact sheet for patients: https://www.fda.gov/media/644106/download    Test performed by PCR.          Adult Transthoracic Echo Complete W/  Cont if Necessary Per Protocol (With Agitated Saline)    Result Date: 9/14/2021  · Left ventricular ejection fraction appears to be 61 - 65%. Left ventricular systolic function is normal. · Left ventricular diastolic function is consistent with (grade Ia w/high LAP) impaired relaxation. · Saline test results are positive for evidence of a PFO. · Mild aortic valve regurgitation is present. · Moderate tricuspid valve regurgitation is present. · Estimated right ventricular systolic pressure from tricuspid regurgitation is mildly elevated (35-45 mmHg).      CT Head Without Contrast    Result Date: 9/15/2021  EXAMINATION: CT HEAD WO CONTRAST-09/14/2021:  INDICATION: Neuro deficit, acute, stroke suspected.  TECHNIQUE: CT head without intravenous contrast.  The radiation dose reduction device was turned on for each scan per the ALARA (As Low as Reasonably Achievable) protocol.  COMPARISON: NONE.  FINDINGS: The midline structures are symmetric without evidence of mass, mass effect or midline shift with only a small area of asymmetry in the left parietal region likely chronic small vessel ischemic disease versus prior insult with background moderate chronic small vessel ischemic disease. No intra-axial hemorrhage or extra-axial fluid collection. Globes and orbits are unremarkable. The paranasal sinuses and mastoid air cells are grossly clear and well pneumatized. Calvarium intact.      Impression: No acute intracranial findings with at least moderately advanced senescent changes of chronic small vessel ischemic disease noted bilateral supratentorial structures. No intra-axial hemorrhage or extra-axial fluid collection.  Scan performed 09/14/2021 at 0802 hours. Scan report made available on 09/14/2021 at 0842 hours.  D:  09/14/2021 E:  09/14/2021    This report was finalized on 9/15/2021 6:37 PM by Dr. Carlos Young.      CT Angiogram Neck    Result Date: 9/15/2021  EXAMINATION: CT ANGIOGRAM NECK-, CT ANGIOGRAM HEAD W AI  ANALYSIS OF LVO-09/14/2021:  INDICATION: Stroke, follow-up.  TECHNIQUE: CT angiogram head and neck with and without intravenous contrast administration. 2-D and 3-D reconstructions performed.  The radiation dose reduction device was turned on for each scan per the ALARA (As Low as Reasonably Achievable) protocol.  COMPARISON: NONE.  FINDINGS:  CTA NECK: Normal 3-vessel arch with patent great vessel origins despite minimal calcific disease greatest in the left subclavian takeoff without significant stenosis. Proximal subclavian arteries are patent. The vertebral arteries demonstrate grossly symmetric caliber without focal severe stenosis, aneurysm or occlusion. The carotids demonstrate grossly normal course and branching pattern with minimal plaque formation including calcific disease producing 10% right and 15% left luminal narrowing as measured by NASCET criteria with patency of the distal internal carotid arteries through the intracranial portions discussed further below. Cervical soft tissue is unremarkable without bulky cervical adenopathy. The thyroid is homogeneous. The lung apices demonstrate emphysematous involvement of centrilobular emphysema.  CTA HEAD: Distal internal carotid arteries without focal severe stenosis, aneurysm or occlusion. Anterior cerebral arteries are patent without hemodynamically significant stenosis, aneurysm or occlusion. Middle cerebral arteries are patent without hemodynamically significant stenosis, aneurysm or occlusion. Vertebrobasilar system and posterior cerebral arteries are patent without hemodynamically significant stenosis, aneurysm or occlusion. Venous structures grossly unremarkable on limited evaluation with superior sagittal sinus widely patent.      Impression: Apart from minimal atherosclerotic calcific disease normal CTA head and neck without hemodynamically significant stenosis, aneurysm or occlusion.  D:  09/14/2021 E:  09/14/2021    This report was finalized on  9/15/2021 6:37 PM by Dr. Carlos Young.      MRI Brain Without Contrast    Result Date: 9/15/2021  EXAMINATION: MRI BRAIN WO CONTRAST-  INDICATION: Stroke, followup; I63.9-Cerebral infarction, unspecified; R47.1-Dysarthria and anarthria; R41.841-Cognitive communication deficit; R13.10-Dysphagia, unspecified.  TECHNIQUE: Multiplanar MRI of the brain without intravenous contrast.  COMPARISON: CT head and CT cerebral perfusion/angiogram performed earlier same day.  FINDINGS: Restricted diffusion in the bilateral frontoparietal regions greatest right frontal region series 3 image 62 of acute infarctions with localized edema in the right frontal lobe for example. No midline shift or hydrocephalus superimposed upon moderate to severe increased T2 and FLAIR signal findings in the periventricular and deep white matter chronic small vessel schema disease. Mild generalized atrophy. No T1 shortening focus or susceptibility artifact at any of these areas or elsewhere. Globes and orbits retain normal T2 signal characteristics. Paranasal sinuses and mastoid air cells are grossly clear and well pneumatized. No cerebellopontine angle mass lesion with normal signal flow voids of the distal internal carotid and vertebrobasilar arteries.      Impression: Acute infarctions within the bilateral frontoparietal regions greatest right frontal lobe, however, multifocal involvement concerning for embolic etiology versus potential watershed appearance with minimal localized edema greatest in the right frontal lobe, however no midline shift or hydrocephalus superimposed upon moderate to severe background chronic small vessel ischemic disease.  D:  09/15/2021 E:  09/15/2021    This report was finalized on 9/15/2021 6:48 PM by Dr. Carlos Young.      Duplex Venous Lower Extremity - Bilateral CAR    Result Date: 9/15/2021  · Acute left lower extremity deep vein thrombosis noted in the mid femoral. · Patient appears to have a partial dual femoral  venous system      CT Angiogram Head w AI Analysis of LVO    Result Date: 9/15/2021  EXAMINATION: CT ANGIOGRAM NECK-, CT ANGIOGRAM HEAD W AI ANALYSIS OF LVO-09/14/2021:  INDICATION: Stroke, follow-up.  TECHNIQUE: CT angiogram head and neck with and without intravenous contrast administration. 2-D and 3-D reconstructions performed.  The radiation dose reduction device was turned on for each scan per the ALARA (As Low as Reasonably Achievable) protocol.  COMPARISON: NONE.  FINDINGS:  CTA NECK: Normal 3-vessel arch with patent great vessel origins despite minimal calcific disease greatest in the left subclavian takeoff without significant stenosis. Proximal subclavian arteries are patent. The vertebral arteries demonstrate grossly symmetric caliber without focal severe stenosis, aneurysm or occlusion. The carotids demonstrate grossly normal course and branching pattern with minimal plaque formation including calcific disease producing 10% right and 15% left luminal narrowing as measured by NASCET criteria with patency of the distal internal carotid arteries through the intracranial portions discussed further below. Cervical soft tissue is unremarkable without bulky cervical adenopathy. The thyroid is homogeneous. The lung apices demonstrate emphysematous involvement of centrilobular emphysema.  CTA HEAD: Distal internal carotid arteries without focal severe stenosis, aneurysm or occlusion. Anterior cerebral arteries are patent without hemodynamically significant stenosis, aneurysm or occlusion. Middle cerebral arteries are patent without hemodynamically significant stenosis, aneurysm or occlusion. Vertebrobasilar system and posterior cerebral arteries are patent without hemodynamically significant stenosis, aneurysm or occlusion. Venous structures grossly unremarkable on limited evaluation with superior sagittal sinus widely patent.      Impression: Apart from minimal atherosclerotic calcific disease normal CTA head  and neck without hemodynamically significant stenosis, aneurysm or occlusion.  D:  09/14/2021 E:  09/14/2021    This report was finalized on 9/15/2021 6:37 PM by Dr. Carlos Young.      CT CEREBRAL PERFUSION WITH & WITHOUT CONTRAST    Result Date: 9/15/2021  EXAMINATION: CT CEREBRAL PERFUSION W WO CONTRAST-  INDICATION: Neuro deficit, acute, stroke suspected.  TECHNIQUE: CT cerebral perfusion with and without intravenous contrast administration. Multiple parametric maps including mean transit time, time to drain, cerebral blood flow and cerebral blood volume performed.  The radiation dose reduction device was turned on for each scan per the ALARA (As Low as Reasonably Achievable) protocol.  COMPARISON: None.  FINDINGS: Symmetric correlating parametric maps without perfusion defect identified specifically no reversible ischemia within a specific vascular territory.      Impression: No reversible ischemia within a specific vascular territory.  D:  09/14/2021 E:  09/14/2021   This report was finalized on 9/15/2021 6:37 PM by Dr. Carlos Young.        Results for orders placed during the hospital encounter of 09/14/21    Adult Transthoracic Echo Complete W/ Cont if Necessary Per Protocol (With Agitated Saline)    Interpretation Summary  · Left ventricular ejection fraction appears to be 61 - 65%. Left ventricular systolic function is normal.  · Left ventricular diastolic function is consistent with (grade Ia w/high LAP) impaired relaxation.  · Saline test results are positive for evidence of a PFO.  · Mild aortic valve regurgitation is present.  · Moderate tricuspid valve regurgitation is present.  · Estimated right ventricular systolic pressure from tricuspid regurgitation is mildly elevated (35-45 mmHg).      I have reviewed the medications:  Scheduled Meds:aspirin, 81 mg, Oral, Daily   Or  aspirin, 300 mg, Rectal, Daily  atorvastatin, 80 mg, Oral, Nightly  citalopram, 20 mg, Oral, Daily  heparin (porcine), 3,000 Units,  Intravenous, Once  nicotine, 1 patch, Transdermal, Q24H  oxybutynin, 5 mg, Oral, TID  sodium chloride, 10 mL, Intravenous, Q12H  sodium chloride, 10 mL, Intravenous, Q12H      Continuous Infusions:heparin, 18 Units/kg/hr, Last Rate: 18 Units/kg/hr (09/15/21 1841)  Pharmacy to Dose Heparin,       PRN Meds:.•  acetaminophen **OR** acetaminophen **OR** acetaminophen  •  influenza vaccine  •  ondansetron **OR** ondansetron  •  Pharmacy to Dose Heparin  •  potassium chloride **OR** potassium chloride **OR** potassium chloride  •  sodium chloride  •  sodium chloride  •  traMADol    Assessment/Plan   Assessment & Plan     Active Hospital Problems    Diagnosis  POA   • **Stroke (CMS/HCC) [I63.9]  Yes   • Essential hypertension [I10]  Unknown   • Tobacco abuse [Z72.0]  Unknown   • Hypokalemia [E87.6]  Unknown      Resolved Hospital Problems   No resolved problems to display.        Brief Hospital Course to date:  Karyna Santiago is a 71 y.o. female fully vaccinated for COVID with PMH of HTN, osteoarthritis, gastric bypass, recent multiple rib fractures due to injury from a calf with incidental COVID19 positive test during that admission (at  in May 2021) and ongoing tobacco abuse presented to [facility] around 4am with left sided weakness. Pt was last known well around 9pm when she fell asleep in her truck. She awoke at 330am with bilateral upper extremity paresthesias which have since only started to affect her LUE. She has also noted left upper extremity weakness.  She denies any h/o CVA but did note recent left arm numbness in May this year that went away on its own.      Acute Stroke  - asa, high intensity statin  - estrogen therapy and ongoing tobacco abuse are modifiable risk factors  - PFO noted on Echo, + femoral DVT on LE duplex -- heparin drip  - PT/OT    Tobacco abuse  - counseled cessation    DVT  - heparin, likely transition to NOAC tomorrow    HTN      DVT prophylaxis:  Medical and mechanical DVT prophylaxis  orders are present.       AM-PAC 6 Clicks Score (PT): 20 (09/15/21 5998)    Disposition: I expect the patient to be discharged TBD    CODE STATUS:   Code Status and Medical Interventions:   Ordered at: 09/14/21 0969     Level Of Support Discussed With:    Patient     Code Status:    CPR     Medical Interventions (Level of Support Prior to Arrest):    Full       Selvin Crowell MD  09/15/21

## 2021-09-16 PROBLEM — E44.0 MODERATE MALNUTRITION (HCC): Status: ACTIVE | Noted: 2021-09-16

## 2021-09-16 LAB
UFH PPP CHRO-ACNC: 0.45 IU/ML (ref 0.3–0.7)
UFH PPP CHRO-ACNC: 0.53 IU/ML (ref 0.3–0.7)
UFH PPP CHRO-ACNC: 0.66 IU/ML (ref 0.3–0.7)

## 2021-09-16 PROCEDURE — 85520 HEPARIN ASSAY: CPT | Performed by: INTERNAL MEDICINE

## 2021-09-16 PROCEDURE — 97530 THERAPEUTIC ACTIVITIES: CPT

## 2021-09-16 PROCEDURE — 99232 SBSQ HOSP IP/OBS MODERATE 35: CPT | Performed by: INTERNAL MEDICINE

## 2021-09-16 PROCEDURE — 99232 SBSQ HOSP IP/OBS MODERATE 35: CPT | Performed by: PSYCHIATRY & NEUROLOGY

## 2021-09-16 PROCEDURE — 85520 HEPARIN ASSAY: CPT

## 2021-09-16 PROCEDURE — 25010000002 HEPARIN (PORCINE) 25000-0.45 UT/250ML-% SOLUTION

## 2021-09-16 PROCEDURE — 97116 GAIT TRAINING THERAPY: CPT

## 2021-09-16 RX ADMIN — OXYBUTYNIN CHLORIDE 5 MG: 5 TABLET ORAL at 22:13

## 2021-09-16 RX ADMIN — ATORVASTATIN CALCIUM 80 MG: 40 TABLET, FILM COATED ORAL at 22:13

## 2021-09-16 RX ADMIN — Medication 1 PATCH: at 08:41

## 2021-09-16 RX ADMIN — ASPIRIN 81 MG CHEWABLE TABLET 81 MG: 81 TABLET CHEWABLE at 08:42

## 2021-09-16 RX ADMIN — SODIUM CHLORIDE, PRESERVATIVE FREE 10 ML: 5 INJECTION INTRAVENOUS at 22:13

## 2021-09-16 RX ADMIN — HEPARIN SODIUM 17 UNITS/KG/HR: 10000 INJECTION, SOLUTION INTRAVENOUS at 17:50

## 2021-09-16 RX ADMIN — CITALOPRAM HYDROBROMIDE 20 MG: 20 TABLET ORAL at 08:43

## 2021-09-16 RX ADMIN — OXYBUTYNIN CHLORIDE 5 MG: 5 TABLET ORAL at 08:42

## 2021-09-16 RX ADMIN — TRAMADOL HYDROCHLORIDE 100 MG: 50 TABLET, FILM COATED ORAL at 22:13

## 2021-09-16 RX ADMIN — TRAMADOL HYDROCHLORIDE 100 MG: 50 TABLET, FILM COATED ORAL at 08:42

## 2021-09-16 RX ADMIN — OXYBUTYNIN CHLORIDE 5 MG: 5 TABLET ORAL at 17:05

## 2021-09-16 RX ADMIN — SODIUM CHLORIDE, PRESERVATIVE FREE 10 ML: 5 INJECTION INTRAVENOUS at 08:42

## 2021-09-16 NOTE — PLAN OF CARE
Goal Outcome Evaluation:  Plan of Care Reviewed With: patient        Progress: improving  Outcome Summary: heparin gtt infusing per orders. Patient pleasant and cooperative and D/C plans are to [facility] when medically stable. PRN tramadol for c/o pain. Ambulated with PT. NIH 2- facial droop, LUE drift. Denies any current needs at this time.

## 2021-09-16 NOTE — THERAPY TREATMENT NOTE
Patient Name: Karyna Santiago  : 1950    MRN: [id number]                              Today's Date: 2021       Admit Date: 2021    Visit Dx:     ICD-10-CM ICD-9-CM   1. Cerebrovascular accident (CVA), unspecified mechanism (CMS/HCC)  I63.9 434.91   2. Dysarthria  R47.1 784.51   3. Cognitive communication deficit  R41.841 799.52   4. Dysphagia, unspecified type  R13.10 787.20     Patient Active Problem List   Diagnosis   • Herniation of lumbar intervertebral disc   • Stroke (CMS/HCC)   • Essential hypertension   • Tobacco abuse   • Hypokalemia     Past Medical History:   Diagnosis Date   • Anemia    • Arthritis    • Blood disease    • Headache    • History of transfusion    • Hypertension      Past Surgical History:   Procedure Laterality Date   • GASTRIC BYPASS       General Information     Row Name 21 1609          Physical Therapy Time and Intention    Document Type  therapy note (daily note)  -CD     Mode of Treatment  physical therapy  -CD     Row Name 21 1609          General Information    Patient Profile Reviewed  yes  -CD     Existing Precautions/Restrictions  fall L UE WEAKNESS.  -CD     Barriers to Rehab  medically complex  -CD     Row Name 21 1609          Cognition    Orientation Status (Cognition)  oriented x 3  -CD     Row Name 21 1609          Safety Issues, Functional Mobility    Safety Issues Affecting Function (Mobility)  insight into deficits/self-awareness  -CD     Impairments Affecting Function (Mobility)  balance;endurance/activity tolerance;grasp;muscle tone abnormal;motor control;strength  -CD     Comment, Safety Issues/Impairments (Mobility)  WEAKNESS L UE. IMPAIRED BALANCE.  -CD       User Key  (r) = Recorded By, (t) = Taken By, (c) = Cosigned By    Initials Name Provider Type    CD Anay Klein PT Physical Therapist        Mobility     Row Name 21 1612          Bed Mobility    Supine-Sit [place] (Bed Mobility)  standby assist  -CD      Assistive Device (Bed Mobility)  head of bed elevated  -CD     Comment (Bed Mobility)  INCREAESED TIME AND EFFORT. CUES TO PROTECT L UE.  -CD     Row Name 09/16/21 1615          Transfers    Comment (Transfers)  STS FROM EOB AND COMMODE.  -CD     Row Name 09/16/21 1615          Sit-Stand Transfer    Sit-Stand [place] (Transfers)  contact guard;verbal cues  -CD     Assistive Device (Sit-Stand Transfers)  cane, straight  -CD     Row Name 09/16/21 1615          Gait/Stairs (Locomotion)    [place] Level (Gait)  contact guard;verbal cues  -CD     Assistive Device (Gait)  cane, straight  -CD     Distance in Feet (Gait)  300 FEET.  -CD     Deviations/Abnormal Patterns (Gait)  david decreased;gait speed decreased  -CD     Bilateral Gait Deviations  forward flexed posture;heel strike decreased  -CD     Comment (Gait/Stairs)  DOES WELL WITH SEQUECING OF CANE. REQUIRED ONE SITTING REST BREAK. STOPPED AT BATHROOM FOR TOILETING PRIOR TO GAIT IN [place]. NEEDS ASSIST WITH HYGIENE DUE TO LIMITED ROM R WRIST AND L UE WEAKNESS.  -CD       User Key  (r) = Recorded By, (t) = Taken By, (c) = Cosigned By    Initials Name Provider Type    CD Anay Klein, PT Physical Therapist        Obj/Interventions     Row Name 09/16/21 1618          Balance    Balance Assessment  sitting static balance;sitting dynamic balance;standing static balance;standing dynamic balance  -CD     Static Sitting Balance  WFL;sitting, edge of bed;supported  -CD     Dynamic Sitting Balance  WFL;supported;sitting, edge of bed  -CD     Static Standing Balance  mild impairment;supported;standing  -CD     Dynamic Standing Balance  mild impairment;supported;standing  -CD     Comment, Balance  CGA FOR GAIT IN [place].  -CD       User Key  (r) = Recorded By, (t) = Taken By, (c) = Cosigned By    Initials Name Provider Type    Anay Laird, PT Physical Therapist        Goals/Plan    No documentation.       Clinical Impression     Row Name 09/16/21 1610           Pain Scale: Numbers Pre/Post-Treatment    Pretreatment Pain Rating  0/10 - no pain  -CD     Posttreatment Pain Rating  0/10 - no pain  -CD     Row Name 09/16/21 1619          Plan of Care Review    Plan of Care Reviewed With  patient  -CD     Progress  improving  -CD     Outcome Summary  PT IS MOTIVATED TO WORK WITH THERAPY. INCREASED DISTANCE AMBULATED  FEET WITH CANE AND CGA. NEWLY DIAGNOSED DVT L LE AND ON HEPARIN. REQUIRES CUES TO PROTECT L UE AND ASSIST WITH HYGIENE DUE TO L UE WEAKNESS. 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  -CD     Row Name 09/16/21 1619          Therapy Assessment/Plan (PT)    Patient/Family Therapy Goals Statement (PT)  TO RETURN TO PLOF.  -CD     Rehab Potential (PT)  good, to achieve stated therapy goals  -CD     Criteria for Skilled Interventions Met (PT)  yes  -CD     Row Name 09/16/21 1619          Vital Signs    Pre Systolic BP Rehab  -- VSS. NSG CLEARED FOR THERAPY.  -CD     Pre SpO2 (%)  94  -CD     O2 Delivery Pre Treatment  supplemental O2  -CD     O2 Delivery Intra Treatment  room air  -CD     Post SpO2 (%)  93  -CD     O2 Delivery Post Treatment  room air  -CD     Pre Patient Position  Supine  -CD     Intra Patient Position  Standing  -CD     Post Patient Position  Sitting  -CD     Row Name 09/16/21 1619          Positioning and Restraints    Pre-Treatment Position  in bed  -CD     Post Treatment Position  chair  -CD     In Chair  reclined;call light within reach;encouraged to call for assist;exit alarm on;legs elevated;notified nsg;waffle cushion NSG NOTIFIED PT NEEDS NEW PUREWICK.  -CD       User Key  (r) = Recorded By, (t) = Taken By, (c) = Cosigned By    Initials Name Provider Type    CD Anay Klein, PT Physical Therapist        Outcome Measures     Row Name 09/16/21 1622 09/16/21 0800       How much help from another person do you currently need...    Turning from your back to your side while in flat bed  without using bedrails?  4  -CD  4  -CA    Moving from lying on back to sitting on the side of a flat bed without bedrails?  4  -CD  4  -CA    Moving to and from a bed to a chair (including a wheelchair)?  3  -CD  3  -CA    Standing up from a chair using your arms (e.g., wheelchair, bedside chair)?  3  -CD  3  -CA    Climbing 3-5 steps with a railing?  3  -CD  3  -CA    To walk in hospital room?  3  -CD  3  -CA    AM-PAC 6 Clicks Score (PT)  20  -CD  20  -CA    Row Name 09/16/21 1622          Modified Lyerly Scale    Modified Lyerly Scale  3 - Moderate disability.  Requiring some help, but able to walk without assistance.  -CD       User Key  (r) = Recorded By, (t) = Taken By, (c) = Cosigned By    Initials Name Provider Type    CD Anay Klein, PT Physical Therapist    Ruy Ortiz RN Registered Nurse                       Physical Therapy Education                 Title: PT OT SLP Therapies (Done)     Topic: Physical Therapy (Done)     Point: Mobility training (Done)     Learning Progress Summary           Patient Acceptance, E, VU,NR by CD at 9/16/2021 1622    Comment: SEE FLOWSHEET.    Acceptance, E, VU,NR by CD at 9/15/2021 1510    Comment: BENEFITS OF OOB ACTIVITY, PROGRESSION OF POC, SAFETY WITH MOBILITY, D/C PLANNING,                   Point: Home exercise program (Done)     Learning Progress Summary           Patient Acceptance, E, VU,NR by CD at 9/16/2021 1622    Comment: SEE FLOWSHEET.    Acceptance, E, VU,NR by CD at 9/15/2021 1510    Comment: BENEFITS OF OOB ACTIVITY, PROGRESSION OF POC, SAFETY WITH MOBILITY, D/C PLANNING,                   Point: Body mechanics (Done)     Learning Progress Summary           Patient Acceptance, E, VU,NR by CD at 9/16/2021 1622    Comment: SEE FLOWSHEET.    Acceptance, E, VU,NR by CD at 9/15/2021 1510    Comment: BENEFITS OF OOB ACTIVITY, PROGRESSION OF POC, SAFETY WITH MOBILITY, D/C PLANNING,                   Point: Precautions (Done)     Learning Progress  Summary           Patient Acceptance, E, VU,NR by CD at 9/16/2021 1622    Comment: SEE FLOWSHEET.    Acceptance, E, VU,NR by CD at 9/15/2021 1510    Comment: BENEFITS OF OOB ACTIVITY, PROGRESSION OF POC, SAFETY WITH MOBILITY, D/C PLANNING,                               User Key     Initials Effective Dates Name Provider Type Discipline    CD 06/16/21 -  Anay Klein PT Physical Therapist PT              PT Recommendation and Plan  Planned Therapy Interventions (PT): balance training, bed mobility training, gait training, home exercise program, transfer training, strengthening, stair training  Plan of Care Reviewed With: patient  Progress: improving  Outcome Summary: PT IS MOTIVATED TO WORK WITH THERAPY. INCREASED DISTANCE AMBULATED  FEET WITH CANE AND CGA. NEWLY DIAGNOSED DVT L LE AND ON HEPARIN. REQUIRES CUES TO PROTECT L UE AND ASSIST WITH HYGIENE DUE TO L UE WEAKNESS. 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     Time Calculation:   PT Charges     Row Name 09/16/21 1623             Time Calculation    Start Time  1518  -CD      PT Received On  09/16/21  -CD      PT Goal Re-Cert Due Date  09/25/21  -CD         Time Calculation- PT    Total Timed Code Minutes- PT  49 minute(s)  -CD         Timed Charges    87790 - Gait Training Minutes   35  -CD      18202 - PT Therapeutic Activity Minutes  14  -CD         Total Minutes    Timed Charges Total Minutes  49  -CD       Total Minutes  49  -CD        User Key  (r) = Recorded By, (t) = Taken By, (c) = Cosigned By    Initials Name Provider Type    CD Anay Klein PT Physical Therapist        Therapy Charges for Today     Code Description Service Date Service Provider Modifiers Qty    96447530032 HC GAIT TRAINING EA 15 MIN 9/15/2021 Anay Klein, PT GP 1    96537588066 HC PT EVAL LOW COMPLEXITY 4 9/15/2021 Anay Klein, PT GP 1    89023073593 HC GAIT TRAINING EA 15 MIN 9/16/2021 Anay Klein, PT GP 2     [id number]  PT THERAPEUTIC ACT EA 15 MIN 9/16/2021 Anay Klein, PT GP 1          PT G-Codes  Outcome Measure Options: AM-PAC 6 Clicks Daily Activity (OT), Modified Cannon  AM-PAC 6 Clicks Score (PT): 20  AM-PAC 6 Clicks Score (OT): 15  Modified Cannon Scale: 3 - Moderate disability.  Requiring some help, but able to walk without assistance.    Anay Klein, PT  9/16/2021

## 2021-09-16 NOTE — PROGRESS NOTES
[facility]  PROGRESS NOTE    Patient Name: Karyna Santiago  : 1950  MRN: [id number]    Date of Admission: 2021  Primary Care Physician: Mason Miranda MD    Subjective   Subjective     CC:  stroke    HPI:  Left arm remains weak. In good spirits.    ROS:  Gen- No fevers, chills  CV- No chest pain, palpitations  Resp- No cough, dyspnea  GI- No N/V/D, abd pain      Objective   Objective     Vital Signs:   Temp:  [97.7 °F (36.5 °C)-98.3 °F (36.8 °C)] 98.3 °F (36.8 °C)  Heart Rate:  [] 106  Resp:  [16] 16  BP: (105-126)/(62-74) 105/62  Flow (L/min):  [2-20] 2     Physical Exam:  Constitutional - no acute distress, frail, in bed  HEENT-NCAT, mucous membranes moist  CV-RRR  Resp-CTAB  Abd-soft, nontender, nondistended, normoactive bowel sounds  Ext-No lower extremity cyanosis, clubbing or edema bilaterally  Neuro-alert, speech clear, LUE weakness  Psych-normal affect   Skin- No rash on exposed UE or LE bilaterally      Results Reviewed:  LAB RESULTS:      Lab 21  1724 21  0849 21  0055 09/15/21  1828 09/15/21  1211 21  0833   WBC  --   --   --  6.57 7.34 6.87   HEMOGLOBIN  --   --   --  13.0 13.2 13.7   HEMATOCRIT  --   --   --  41.4 44.2 44.3   PLATELETS  --   --   --  260 263 233   NEUTROS ABS  --   --   --  3.62  --  4.85   IMMATURE GRANS (ABS)  --   --   --  0.06*  --  0.02   LYMPHS ABS  --   --   --  1.84  --  1.23   MONOS ABS  --   --   --  0.87  --  0.66   EOS ABS  --   --   --  0.13  --  0.06   MCV  --   --   --  95.0 100.2* 95.1   PROTIME  --   --   --  13.1  --   --    APTT  --   --   --  28.1*  --   --    HEPARIN ANTI-XA 0.45 0.53 0.66 0.10*  --   --          Lab 09/15/21  1211 21  2349 21  0918 21  0833   SODIUM 139  --  137  --    POTASSIUM 3.9 4.2 3.1*  --    CHLORIDE 107  --  101  --    CO2 22.0  --  27.0  --    ANION GAP 10.0  --  9.0  --    BUN 13  --  13  --    CREATININE 0.61  --  0.62  --    GLUCOSE 97   --  98  --    CALCIUM 9.0  --  8.6  --    HEMOGLOBIN A1C  --   --   --  5.40   TSH  --   --  0.567  --          Lab 09/14/21  0918   TOTAL PROTEIN 5.3*   ALBUMIN 3.00*   GLOBULIN 2.3   ALT (SGPT) 7   AST (SGOT) 13   BILIRUBIN 0.6   ALK PHOS 57         Lab 09/15/21  1828   PROTIME 13.1   INR 1.02         Lab 09/14/21  0918   CHOLESTEROL 134   LDL CHOL 59   HDL CHOL 63*   TRIGLYCERIDES 58         Lab 09/14/21  0954   FOLATE >20.00   VITAMIN B 12 1,160*         Brief Urine Lab Results  (Last result in the past 365 days)      Color   Clarity   Blood   Leuk Est   Nitrite   Protein   CREAT   Urine HCG        09/14/21 1615 Dark Yellow Clear Negative Negative Positive Negative               Microbiology Results Abnormal     Procedure Component Value - Date/Time    COVID PRE-OP / PRE-PROCEDURE SCREENING ORDER (NO ISOLATION) - Swab, Nasopharynx [[id number]]  (Normal) Collected: 09/14/21 1113    Lab Status: Final result Specimen: Swab from Nasopharynx Updated: 09/14/21 1247    Narrative:      The following orders were created for panel order COVID PRE-OP / PRE-PROCEDURE SCREENING ORDER (NO ISOLATION) - Swab, Nasopharynx.  Procedure                               Abnormality         Status                     ---------                               -----------         ------                     COVID-19,CEPHEID/CHAS,LE...[[id number]]  Normal              Final result                 Please view results for these tests on the individual orders.    COVID-19,CEPHEID/CHAS,[facility] IN-HOUSE(OR EMERGENT/ADD-ON),NP SWAB IN TRANSPORT MEDIA 3-4 HR TAT - Swab, Nasopharynx [[id number]]  (Normal) Collected: 09/14/21 1113    Lab Status: Final result Specimen: Swab from Nasopharynx Updated: 09/14/21 1247     COVID19 Not Detected    Narrative:      Fact sheet for providers: https://www.fda.gov/media/498496/download     Fact sheet for patients: https://www.fda.gov/media/231662/download  Fact sheet for providers:  https://www.fda.gov/media/131395/download    Fact sheet for patients: https://www.fda.gov/media/169139/download    Test performed by [facility].          MRI Brain Without Contrast    Result Date: 9/15/2021  EXAMINATION: MRI BRAIN WO CONTRAST-  INDICATION: Stroke, followup; I63.9-Cerebral infarction, unspecified; R47.1-Dysarthria and anarthria; R41.841-Cognitive communication deficit; R13.10-Dysphagia, unspecified.  TECHNIQUE: Multiplanar MRI of the brain without intravenous contrast.  COMPARISON: CT head and CT cerebral perfusion/angiogram performed earlier same day.  FINDINGS: Restricted diffusion in the bilateral frontoparietal regions greatest right frontal region series 3 image 62 of acute infarctions with localized edema in the right frontal lobe for example. No midline shift or hydrocephalus superimposed upon moderate to severe increased T2 and FLAIR signal findings in the periventricular and deep white matter chronic small vessel schema disease. Mild generalized atrophy. No T1 shortening focus or susceptibility artifact at any of these areas or elsewhere. Globes and orbits retain normal T2 signal characteristics. Paranasal sinuses and mastoid air cells are grossly clear and well pneumatized. No cerebellopontine angle mass lesion with normal signal flow voids of the distal internal carotid and vertebrobasilar arteries.      Impression: Acute infarctions within the bilateral frontoparietal regions greatest right frontal lobe, however, multifocal involvement concerning for embolic etiology versus potential watershed appearance with minimal localized edema greatest in the right frontal lobe, however no midline shift or hydrocephalus superimposed upon moderate to severe background chronic small vessel ischemic disease.  D:  09/15/2021 E:  09/15/2021    This report was finalized on 9/15/2021 6:48 PM by Dr. Carlos Young.      Duplex Venous Lower Extremity - Bilateral CAR    Result Date: 9/15/2021  · Acute left lower  extremity deep vein thrombosis noted in the mid femoral. 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 Left ventricular systolic function is normal.  · Left ventricular diastolic function is consistent with (grade Ia w/high LAP) impaired relaxation.  · Saline test results are positive for evidence of a PFO.  · Mild aortic valve regurgitation is present.  · Moderate tricuspid valve regurgitation is present.  · Estimated right ventricular systolic pressure from tricuspid regurgitation is mildly elevated (35-45 mmHg).      I have reviewed the medications:  Scheduled Meds:aspirin, 81 mg, Oral, Daily   Or  aspirin, 300 mg, Rectal, Daily  atorvastatin, 80 mg, Oral, Nightly  citalopram, 20 mg, Oral, Daily  nicotine, 1 patch, Transdermal, Q24H  oxybutynin, 5 mg, Oral, TID  sodium chloride, 10 mL, Intravenous, Q12H  sodium chloride, 10 mL, Intravenous, Q12H      Continuous Infusions:heparin, 17 Units/kg/hr, Last Rate: 17 Units/kg/hr (09/16/21 1750)  Pharmacy to Dose Heparin,       PRN Meds:.•  acetaminophen **OR** acetaminophen **OR** acetaminophen  •  influenza vaccine  •  ondansetron **OR** ondansetron  •  Pharmacy to Dose Heparin  •  potassium chloride **OR** potassium chloride **OR** potassium chloride  •  sodium chloride  •  sodium chloride  •  traMADol    Assessment/Plan   Assessment & Plan     Active Hospital Problems    Diagnosis  POA   • **Stroke (CMS/HCC) [I63.9]  Yes   • Essential hypertension [I10]  Unknown   • Tobacco abuse [Z72.0]  Unknown   • Hypokalemia [E87.6]  Unknown      Resolved Hospital Problems   No resolved problems to display.        Brief Hospital Course to date:  Karyna Santiago is a 71 y.o. female fully vaccinated for COVID with  PMH of HTN, osteoarthritis, gastric bypass, recent multiple rib fractures due to injury from a calf with incidental COVID19 positive test during that admission (at  in May 2021) and ongoing tobacco abuse presented to [facility] around 4am with left sided weakness. Pt was last known well around 9pm when she fell asleep in her truck. She awoke at 330am with bilateral upper extremity paresthesias which have since only started to affect her LUE. She has also noted left upper extremity weakness.  She denies any h/o CVA but did note recent left arm numbness in May this year that went away on its own.      Acute Stroke  - asa, high intensity statin  - estrogen therapy and ongoing tobacco abuse are modifiable risk factors, cessation of both advised  - PFO noted on Echo, + femoral DVT on LE duplex -- heparin drip with eventual transition to eliquis therapy, follow up with Cardiology in clinic next week.  - PT/OT    Tobacco abuse  - counseled cessation    DVT  - heparin, transition to NOAC prior to discharge    HTN      DVT prophylaxis:  Medical and mechanical DVT prophylaxis orders are present.       AM-PAC 6 Clicks Score (PT): 20 (09/16/21 1622)    Disposition: I expect the patient to be discharged to rehab anytime, appears medically ready for transfer.    CODE STATUS:   Code Status and Medical Interventions:   Ordered at: 09/14/21 0974     Level Of Support Discussed With:    Patient     Code Status:    CPR     Medical Interventions (Level of Support Prior to Arrest):    Full       Selvin Crowell MD  09/16/21

## 2021-09-16 NOTE — PROGRESS NOTES
"Neurology       Patient Care Team:  Mason Miranda MD as PCP - General (Internal Medicine)  Mason Miranda MD as Referring Physician (Internal Medicine)    Chief complaint left-sided weakness      Subjective .     History:    Femoral vein clot/started on heparin overnight  No complaints this afternoon      Objective     Vital Signs   Blood pressure 112/67, pulse 85, temperature 97.7 °F (36.5 °C), temperature source Oral, resp. rate 16, height 157.5 cm (62.01\"), weight 55.2 kg (121 lb 11.1 oz), SpO2 94 %.    Physical Exam:  Very pleasant elderly woman, resting comfortably when I enter the room.  She wakes easily.  She is oriented to person place and time.  Attends to me well and follows commands.  She still has some pretty significant left arm weakness distal versus proximal.  She was unable to  but can extend at the elbow without gravity.  Can briefly lift off the bed at the shoulder.  Sensation is intact to light touch in arm leg face bilaterally.  No other focal weakness noted.  No facial weakness appreciated on exam today.    She is on room air, no distress.  Cardiac regular rate and rhythm, no pitting edema.  Results Review:  Lower extremity Dopplers  · Acute left lower extremity deep vein thrombosis noted in the mid femoral.  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 PFO noted on 2d Echo and lower extremity duplex revealed DVT; she is on heparin now, will transition to eliquis in the next few days. Low ROPE score for PFO closure, will see Cardiology next week (had appt already set up to review recent outpatient echo). Patient needs to stop taking estrogen. She also needs to quit smoking.     Agree w/plan per below  -lower extremity duplex revealed DVT  -transition soon from heparin to eliquis 5mg bid  -low ROPE score, has plans to see Cardiology next week as outpatient in [place] (Dr Narvaez)  -stop " estrogen  -quit smoking   -asa  -statin    **Please send d/c note with echo findings to Dr Narvaez**    I discussed the patients findings and my recommendations with patient, primary team  Karyn Collier MD  09/16/21  07:41 EDT

## 2021-09-16 NOTE — CASE MANAGEMENT/SOCIAL WORK
Continued Stay Note   [facility]     Patient Name: Karyna Santiago  MRN: [id number]  Today's Date: 9/16/2021    Admit Date: 9/14/2021    Discharge Plan     Row Name 09/16/21 1211       Plan    Plan  inpt rehab    Patient/Family in Agreement with Plan  yes    Plan Comments  Case mgt f/u. I met with Mrs Santiago, she is now interested in going to inpt rehab. We discussed options, [facility] vs SNF near her home. She has requested [facility]. I spoke with Nicolasa who accepted referral. Case will con't to follow,please advise when medically ready    Final Discharge Disposition Code  62 - inpatient rehab facility        Discharge Codes    No documentation.       Expected Discharge Date and Time     Expected Discharge Date Expected Discharge Time    Sep 18, 2021             Sonja C Kellerman, RN

## 2021-09-16 NOTE — PROGRESS NOTES
Nutrition Services    Patient Name:  Karyna Santiago  YOB: 1950  MRN: [id number]  Admit Date:  9/14/2021    RD discussed ONS with pt. Pt reported she likes the Premier Protein and tolerated with no GI symptoms. Pt reported appetite is good but she eats smaller amounts throughout the day. RD communicated lunch preferences to kitchen. RD encouraged intake and ONS.    RD will continue to monitor per protocol.    Electronically signed by:  Edmond Velasco RD  09/16/21 10:07 EDT

## 2021-09-16 NOTE — PLAN OF CARE
Goal Outcome Evaluation:  Plan of Care Reviewed With: patient        Progress: improving  Outcome Summary: PT IS MOTIVATED TO WORK WITH THERAPY. INCREASED DISTANCE AMBULATED  FEET WITH CANE AND CGA. NEWLY DIAGNOSED DVT L LE AND ON HEPARIN. REQUIRES CUES TO PROTECT L UE AND ASSIST WITH HYGIENE DUE TO L UE WEAKNESS. 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

## 2021-09-16 NOTE — PLAN OF CARE
Goal Outcome Evaluation:  Plan of Care Reviewed With: patient     VSS, AOX4, 2 L NC, NIH: 2. Heparin bolus administered and heparin gtt maintained as ordered. Pt slept well most of night. No further complaints at this time.     Progress: improving

## 2021-09-17 ENCOUNTER — READMISSION MANAGEMENT (OUTPATIENT)
Dept: CALL CENTER | Facility: HOSPITAL | Age: 71
End: 2021-09-17

## 2021-09-17 VITALS
WEIGHT: 121.69 LBS | RESPIRATION RATE: 16 BRPM | TEMPERATURE: 97.7 F | SYSTOLIC BLOOD PRESSURE: 116 MMHG | HEART RATE: 77 BPM | BODY MASS INDEX: 22.39 KG/M2 | HEIGHT: 62 IN | DIASTOLIC BLOOD PRESSURE: 60 MMHG | OXYGEN SATURATION: 92 %

## 2021-09-17 PROBLEM — E87.6 HYPOKALEMIA: Status: RESOLVED | Noted: 2021-09-14 | Resolved: 2021-09-17

## 2021-09-17 LAB
ANION GAP SERPL CALCULATED.3IONS-SCNC: 7 MMOL/L (ref 5–15)
BUN SERPL-MCNC: 14 MG/DL (ref 8–23)
BUN/CREAT SERPL: 23 (ref 7–25)
CALCIUM SPEC-SCNC: 8.5 MG/DL (ref 8.6–10.5)
CHLORIDE SERPL-SCNC: 108 MMOL/L (ref 98–107)
CO2 SERPL-SCNC: 25 MMOL/L (ref 22–29)
CREAT SERPL-MCNC: 0.61 MG/DL (ref 0.57–1)
DEPRECATED RDW RBC AUTO: 51.3 FL (ref 37–54)
ERYTHROCYTE [DISTWIDTH] IN BLOOD BY AUTOMATED COUNT: 15 % (ref 12.3–15.4)
GFR SERPL CREATININE-BSD FRML MDRD: 96 ML/MIN/1.73
GLUCOSE SERPL-MCNC: 75 MG/DL (ref 65–99)
HCT VFR BLD AUTO: 40 % (ref 34–46.6)
HGB BLD-MCNC: 12.8 G/DL (ref 12–15.9)
MCH RBC QN AUTO: 29.8 PG (ref 26.6–33)
MCHC RBC AUTO-ENTMCNC: 32 G/DL (ref 31.5–35.7)
MCV RBC AUTO: 93 FL (ref 79–97)
PLATELET # BLD AUTO: 285 10*3/MM3 (ref 140–450)
PMV BLD AUTO: 10 FL (ref 6–12)
POTASSIUM SERPL-SCNC: 3.8 MMOL/L (ref 3.5–5.2)
RBC # BLD AUTO: 4.3 10*6/MM3 (ref 3.77–5.28)
SODIUM SERPL-SCNC: 140 MMOL/L (ref 136–145)
UFH PPP CHRO-ACNC: 0.28 IU/ML (ref 0.3–0.7)
UFH PPP CHRO-ACNC: 0.44 IU/ML (ref 0.3–0.7)
WBC # BLD AUTO: 7.1 10*3/MM3 (ref 3.4–10.8)

## 2021-09-17 PROCEDURE — 80048 BASIC METABOLIC PNL TOTAL CA: CPT | Performed by: INTERNAL MEDICINE

## 2021-09-17 PROCEDURE — 25010000002 HEPARIN (PORCINE) PER 1000 UNITS

## 2021-09-17 PROCEDURE — 85520 HEPARIN ASSAY: CPT

## 2021-09-17 PROCEDURE — 92526 ORAL FUNCTION THERAPY: CPT

## 2021-09-17 PROCEDURE — 85027 COMPLETE CBC AUTOMATED: CPT | Performed by: INTERNAL MEDICINE

## 2021-09-17 PROCEDURE — 99239 HOSP IP/OBS DSCHRG MGMT >30: CPT | Performed by: INTERNAL MEDICINE

## 2021-09-17 RX ORDER — HEPARIN SODIUM 1000 [USP'U]/ML
2000 INJECTION, SOLUTION INTRAVENOUS; SUBCUTANEOUS ONCE
Status: COMPLETED | OUTPATIENT
Start: 2021-09-17 | End: 2021-09-17

## 2021-09-17 RX ORDER — ASPIRIN 81 MG/1
81 TABLET, CHEWABLE ORAL DAILY
Start: 2021-09-18

## 2021-09-17 RX ORDER — NICOTINE 21 MG/24HR
1 PATCH, TRANSDERMAL 24 HOURS TRANSDERMAL
Start: 2021-09-18

## 2021-09-17 RX ORDER — ACETAMINOPHEN 325 MG/1
650 TABLET ORAL EVERY 4 HOURS PRN
Start: 2021-09-17

## 2021-09-17 RX ORDER — ATORVASTATIN CALCIUM 80 MG/1
80 TABLET, FILM COATED ORAL NIGHTLY
Qty: 30 TABLET | Refills: 0 | Status: SHIPPED | OUTPATIENT
Start: 2021-09-17

## 2021-09-17 RX ORDER — TRAMADOL HYDROCHLORIDE 50 MG/1
100 TABLET ORAL EVERY 6 HOURS PRN
Qty: 6 TABLET | Refills: 0 | Status: SHIPPED | OUTPATIENT
Start: 2021-09-17

## 2021-09-17 RX ADMIN — HEPARIN SODIUM 2000 UNITS: 1000 INJECTION INTRAVENOUS; SUBCUTANEOUS at 01:37

## 2021-09-17 RX ADMIN — APIXABAN 5 MG: 5 TABLET, FILM COATED ORAL at 12:36

## 2021-09-17 RX ADMIN — TRAMADOL HYDROCHLORIDE 100 MG: 50 TABLET, FILM COATED ORAL at 10:12

## 2021-09-17 RX ADMIN — OXYBUTYNIN CHLORIDE 5 MG: 5 TABLET ORAL at 10:07

## 2021-09-17 RX ADMIN — CITALOPRAM HYDROBROMIDE 20 MG: 20 TABLET ORAL at 10:07

## 2021-09-17 RX ADMIN — SODIUM CHLORIDE, PRESERVATIVE FREE 10 ML: 5 INJECTION INTRAVENOUS at 10:08

## 2021-09-17 RX ADMIN — ASPIRIN 81 MG CHEWABLE TABLET 81 MG: 81 TABLET CHEWABLE at 10:07

## 2021-09-17 RX ADMIN — Medication 1 PATCH: at 10:07

## 2021-09-17 NOTE — THERAPY TREATMENT NOTE
Acute Care - Speech Language Pathology   Swallow Treatment Note [facility]     Patient Name: Karyna Santiago  : 1950  MRN: [id number]  Today's Date: 2021               Admit Date: 2021    Visit Dx:     ICD-10-CM ICD-9-CM   1. Cerebrovascular accident (CVA), unspecified mechanism (CMS/HCC)  I63.9 434.91   2. Dysarthria  R47.1 784.51   3. Cognitive communication deficit  R41.841 799.52   4. Dysphagia, unspecified type  R13.10 787.20     Patient Active Problem List   Diagnosis   • Herniation of lumbar intervertebral disc   • Stroke (CMS/HCC)   • Essential hypertension   • Tobacco abuse   • Hypokalemia   • Moderate malnutrition (CMS/HCC)     Past Medical History:   Diagnosis Date   • Anemia    • Arthritis    • Blood disease    • Headache    • History of transfusion    • Hypertension      Past Surgical History:   Procedure Laterality Date   • GASTRIC BYPASS         SLP Recommendation and Plan     SLP Diet Recommendation: soft textures, whole, thin liquids  Recommended Precautions and Strategies: upright posture during/after eating, small bites of food and sips of liquid  SLP Rec. for Method of Medication Administration: meds whole, with pudding or applesauce, as tolerated     Monitor for Signs of Aspiration: yes, notify SLP if any concerns     Swallow Criteria for Skilled Therapeutic Interventions Met: demonstrates skilled criteria  Anticipated Discharge Disposition (SLP): unknown, anticipate therapy at next level of care  Rehab Potential/Prognosis, Swallowing: good, to achieve stated therapy goals     Predicted Duration Therapy Intervention (Days): until discharge     Daily Summary of Progress (SLP): progress toward functional goals as expected    Plan for Continued Treatment (SLP): No s/s of aspiration w/ trials of thin liquids via sm straw sips or trials of soft/chopped solids from breakfast tray. Will sign off. Re-consult should further issues arise.                         SWALLOW EVALUATION (last  72 hours)      SLP Adult Swallow Evaluation     Row Name 09/17/21 0845                   Rehab Evaluation    Document Type  therapy note (daily note)  -EN        Subjective Information  no complaints  -EN        Patient Observations  alert;cooperative;agree to therapy  -EN        Patient/Family/Caregiver Comments/Observations  no family present   -EN        Care Plan Review  evaluation/treatment results reviewed;care plan/treatment goals reviewed;risks/benefits reviewed;current/potential barriers reviewed;patient/other agree to care plan  -EN        Patient Effort  good  -EN        Symptoms Noted During/After Treatment  none  -EN           General Information    Patient Profile Reviewed  yes  -EN           Pain    Additional Documentation  Pain Scale: FACES Pre/Post-Treatment (Group)  -EN           Pain Scale: FACES Pre/Post-Treatment    Pain: FACES Scale, Pretreatment  0-->no hurt  -EN        Posttreatment Pain Rating  0-->no hurt  -EN           Clinical Impression    Daily Summary of Progress (SLP)  progress toward functional goals as expected  -EN        Functional Impact  risk of aspiration/pneumonia  -EN        Rehab Potential/Prognosis, Swallowing  good, to achieve stated therapy goals  -EN        Swallow Criteria for Skilled Therapeutic Interventions Met  demonstrates skilled criteria  -EN        Plan for Continued Treatment (SLP)  No s/s of aspiration w/ trials of thin liquids via sm straw sips or trials of soft/chopped solids from breakfast tray. Will sign off. Re-consult should further issues arise.   -EN           Recommendations    Predicted Duration Therapy Intervention (Days)  until discharge  -EN        SLP Diet Recommendation  soft textures;whole;thin liquids  -EN        Recommended Precautions and Strategies  upright posture during/after eating;small bites of food and sips of liquid  -EN        Oral Care Recommendations  Oral Care BID/PRN  -EN        SLP Rec. for Method of Medication Administration   meds whole;with pudding or applesauce;as tolerated  -EN        Monitor for Signs of Aspiration  yes;notify SLP if any concerns  -EN        Anticipated Discharge Disposition (SLP)  unknown;anticipate therapy at next level of care  -EN          User Key  (r) = Recorded By, (t) = Taken By, (c) = Cosigned By    Initials Name Effective Dates    EN Cleopatra Monreal, MS, CFY-SLP 05/20/21 -           EDUCATION  The patient has been educated in the following areas:   Dysphagia (Swallowing Impairment) Modified Diet Instruction.       SLP GOALS     Row Name 09/17/21 0845 09/15/21 1529          Oral Nutrition/Hydration Goal 1 (SLP)    Oral Nutrition/Hydration Goal 1, SLP  STG: Pt. will tolerate soft, chopped diet consistency and thin liquids with small single sips and general precautions without s/s of aspiration in 100% of trials  -EN  STG: Pt. will tolerate soft, chopped diet consistency and thin liquids with small single sips and general precautions without s/s of aspiration in 100% of trials  -CH     Time Frame (Oral Nutrition/Hydration Goal 1, SLP)  by discharge  -EN  by discharge  -CH     Barriers (Oral Nutrition/Hydration Goal 1, SLP)  No issues w/ changed diet.   -EN  Patient having difficulty w mastication of soft whole solids. Diet consistency changed to soft, chopped and thin liquids with added sauce/gravy for moisture  -CH     Progress/Outcomes (Oral Nutrition/Hydration Goal 1, SLP)  continuing progress toward goal;goal revised this date  -EN  continuing progress toward goal;goal revised this date  -CH        Oral Nutrition/Hydration Goal 2 (SLP)    Oral Nutrition/Hydration Goal 2, SLP  LTG: Pt. will tolerate soft, whole diet consistency and thin liquids with small single sips and general precautions without s/s of aspiration in 100% of trials  -EN  LTG: Pt. will tolerate soft, whole diet consistency and thin liquids with small single sips and general precautions without s/s of aspiration in 100% of trials  -CH      Time Frame (Oral Nutrition/Hydration Goal 2, SLP)  by discharge  -EN  by discharge  -CH     Barriers (Oral Nutrition/Hydration Goal 2, SLP)  --  Patient having difficulty w mastication of soft whole solids. Diet consistency changed to soft, chopped and thin liquids with added sauce/gravy for moisture  -CH     Progress/Outcomes (Oral Nutrition/Hydration Goal 2, SLP)  good progress toward goal  -EN  continuing progress toward goal  -CH        Word Retrieval Skills Goal 1 (SLP)    Improve Word Retrieval Skills By Goal 1 (SLP)  --  low frequency;responsive naming task;supplying an antonym;supplying a synonym;completing a divergent task;80%;with minimal cues (75-90%)  -CH     Time Frame (Word Retrieval Goal 1, SLP)  --  by discharge  -CH     Progress (Word Retrieval Skills Goal 1, SLP)  --  100%;independently (over 90% accuracy)  -CH     Progress/Outcomes (Word Retrieval Goal 1, SLP)  --  goal met  -CH        Articulation Goal 1 (SLP)    Improve Articulation Goal 1 (SLP)  --  by over-articulating in connected speech;80%;with minimal cues (75-90%)  -CH     Time Frame (Articulation Goal 1, SLP)  --  by discharge  -CH     Progress (Articulation Goal 1, SLP)  --  90%;with minimal cues (75-90%)  -CH     Progress/Outcomes (Articulation Goal 1, SLP)  --  goal met  -CH        Additional Goal 1 (SLP)    Additional Goal 1, SLP  --  LTG: Patient will improve speech and language skills to WFL without verbal cues   -CH     Time Frame (Additional Goal 1, SLP)  --  by discharge  -CH     Progress/Outcomes (Additional Goal 1, SLP)  --  goal met  -CH       User Key  (r) = Recorded By, (t) = Taken By, (c) = Cosigned By    Initials Name Provider Type    CH Nan Barcenas MS CCC-SLP Speech and Language Pathologist    Cleopatra Yo MS, CFY-SLP Speech and Language Pathologist             Time Calculation:   Time Calculation- SLP     Row Name 09/17/21 0959             Time Calculation- SLP    SLP Start Time  0845  -EN      SLP  Received On  09/17/21  -EN         Untimed Charges    71697-DS Treatment Swallow Minutes  25  -EN         Total Minutes    Untimed Charges Total Minutes  25  -EN       Total Minutes  25  -EN        User Key  (r) = Recorded By, (t) = Taken By, (c) = Cosigned By    Initials Name Provider Type    EN Cleopatra Monreal MS, CFY-SLP Speech and Language Pathologist          Therapy Charges for Today     Code Description Service Date Service Provider Modifiers Qty    [id number]  ST TREATMENT SWALLOW 2 9/17/2021 Cleopatra Monreal MS, CFY-SLP GN 1               Cleopatra Monreal MS, CFY-SLP  9/17/2021

## 2021-09-17 NOTE — DISCHARGE PLACEMENT REQUEST
"Karyna Santiago (71 y.o. Female)     Date of Birth Social Security Number Address Home Phone MRN    1950  [address] [address] [phone] [id number]    Protestant Marital Status          None        Admission Date Admission Type Admitting Provider Attending Provider Department, Room/Bed    21 Urgent Selvin Crowell MD Sloan, Walker E, MD [facility] 3F, S319/1    Discharge Date Discharge Disposition Discharge Destination         Home or Self Care              Attending Provider: Selvin Crowell MD    Allergies: Clindamycin/lincomycin, Milk-related Compounds, Nsaids    Isolation: None   Infection: None   Code Status: CPR    Ht: 157.5 cm (62.01\")   Wt: 55.2 kg (121 lb 11.1 oz)    Admission Cmt: None   Principal Problem: Stroke (CMS/HCC) [I63.9] More...                 Active Insurance as of 2021     Primary Coverage     Payor Plan Insurance Group Employer/Plan Group    MEDICARE MEDICARE A & B      Payor Plan Address Payor Plan Phone Number Payor Plan Fax Number Effective Dates    [address] [phone]  2014 - None Entered    [place] [address]       Subscriber Name Subscriber Birth Date Member ID       KARYNA SANTIAGO 1950 [id number]                 Emergency Contacts      (Rel.) Home Phone Work Phone Mobile Phone    Vitaly Romero (Brother) -- -- [phone]    Romi Palencia (Friend) -- -- [phone]               Discharge Summary      Selvin Crowell MD at [address] [place] [address] Medicine Services  DISCHARGE SUMMARY    Patient Name: Karyna Santiago  : 1950  MRN: [id number]    Date of Admission: 2021  7:45 AM  Date of Discharge:  21  Primary Care Physician: Mason Miranda MD    Consults     Date and Time Order Name Status Description    2021  9:58 AM Inpatient Neurology Consult Stroke Completed           Hospital Course     Presenting Problem:   Stroke (CMS/HCC) " [I63.9]    Active Hospital Problems    Diagnosis  POA   • **Stroke (CMS/HCC) [I63.9]  Yes   • Moderate malnutrition (CMS/HCC) [E44.0]  Yes   • Essential hypertension [I10]  Yes   • Tobacco abuse [Z72.0]  Yes      Resolved Hospital Problems    Diagnosis Date Resolved POA   • Hypokalemia [E87.6] 09/17/2021 Yes          Hospital Course:  Karyna Santiago is a 71 y.o. female fully vaccinated for COVID with PMH of HTN, osteoarthritis, gastric bypass, recent multiple rib fractures due to injury from a calf with incidental COVID19 positive test during that admission (at  in May 2021) and ongoing tobacco abuse presented to [facility] around 4am with left sided weakness. Pt was last known well around 9pm the night before when she fell asleep in her truck. She awoke at 330am with bilateral upper extremity paresthesias which subsequently started to affect her LUE strength.  She denies any h/o CVA but did note recent left arm numbness in May this year that went away on its own.       Acute Stroke  - MRI brain showed acute infarctions within the bilateral frontoparietal regions, greatest right frontal lobe  - asa, high intensity statin  - Neurology evaluated Ms Santiago - estrogen therapy and ongoing tobacco abuse are modifiable risk factors that may have contributed to the stroke, cessation of both advised  - PFO noted on Echo, and a femoral DVT observed on LE Duplex -- heparin drip with eventual transition to eliquis therapy prior to discharge. First dose of eliquis given at 12:30 PM on the day of discharge with subsequent dosing at roughly 12 hours intervals recommended at rehab  - Patient has a low ROPE score, but will need followup with her primary Cardiologist Dr Narvaez to evaluate appropriateness for PFO closure.  Fortunately, she does have followup scheduled on Monday.  - continued LUE weakness at discharge, patient will be transferred to [facility] rehab today.     Tobacco abuse  - counseled cessation, provided  nicotine replacement     DVT  - eliquis 5 mg q12, last dose in the hospital at 12:30 prior to transfer  - will need at least 6 months therapy       Discharge Follow Up Recommendations for outpatient labs/diagnostics:      Day of Discharge     HPI:   Feels better, more strength in upper arm today, still cant wiggle fingers on left hand.    Review of Systems  Gen- No fevers, chills  CV- No chest pain, palpitations  Resp- No cough, dyspnea  GI- No N/V/D, abd pain        Vital Signs:   Temp:  [97.7 °F (36.5 °C)-98.2 °F (36.8 °C)] 97.7 °F (36.5 °C)  Heart Rate:  [] 77  Resp:  [16-18] 16  BP: (115-117)/(60-77) 116/60     Physical Exam:  Constitutional - no acute distress, nontoxic, in bed, frail  HEENT-NCAT, mucous membranes moist  CV-RRR, S1 S2 normal, no m/r/g  Resp-CTAB  Abd-soft, nontender, nondistended, normoactive bowel sounds  Ext-No lower extremity cyanosis, clubbing or edema bilaterally  Neuro-alert and oriented, able to lift upper arm off pillow, unable to wiggle fingers on left hand  Psych-normal affect   Skin- No rash on exposed UE or LE bilaterally      Pertinent  and/or Most Recent Results     LAB RESULTS:      Lab 09/17/21  0713 09/16/21  2318 09/16/21  1724 09/16/21  0849 09/16/21  0055 09/15/21  1828 09/15/21  1828 09/15/21  1211 09/14/21  0833   WBC 7.10  --   --   --   --   --  6.57 7.34 6.87   HEMOGLOBIN 12.8  --   --   --   --   --  13.0 13.2 13.7   HEMATOCRIT 40.0  --   --   --   --   --  41.4 44.2 44.3   PLATELETS 285  --   --   --   --   --  260 263 233   NEUTROS ABS  --   --   --   --   --   --  3.62  --  4.85   IMMATURE GRANS (ABS)  --   --   --   --   --   --  0.06*  --  0.02   LYMPHS ABS  --   --   --   --   --   --  1.84  --  1.23   MONOS ABS  --   --   --   --   --   --  0.87  --  0.66   EOS ABS  --   --   --   --   --   --  0.13  --  0.06   MCV 93.0  --   --   --   --   --  95.0 100.2* 95.1   PROTIME  --   --   --   --   --   --  13.1  --   --    APTT  --   --   --   --   --   --   28.1*  --   --    HEPARIN ANTI-XA 0.44 0.28* 0.45 0.53 0.66   < > 0.10*  --   --     < > = values in this interval not displayed.         Lab 09/17/21  0713 09/15/21  1211 09/14/21  2349 09/14/21  0918 09/14/21  0833   SODIUM 140 139  --  137  --    POTASSIUM 3.8 3.9 4.2 3.1*  --    CHLORIDE 108* 107  --  101  --    CO2 25.0 22.0  --  27.0  --    ANION GAP 7.0 10.0  --  9.0  --    BUN 14 13  --  13  --    CREATININE 0.61 0.61  --  0.62  --    GLUCOSE 75 97  --  98  --    CALCIUM 8.5* 9.0  --  8.6  --    HEMOGLOBIN A1C  --   --   --   --  5.40   TSH  --   --   --  0.567  --          Lab 09/14/21  0918   TOTAL PROTEIN 5.3*   ALBUMIN 3.00*   GLOBULIN 2.3   ALT (SGPT) 7   AST (SGOT) 13   BILIRUBIN 0.6   ALK PHOS 57         Lab 09/15/21  1828   PROTIME 13.1   INR 1.02         Lab 09/14/21  0918   CHOLESTEROL 134   LDL CHOL 59   HDL CHOL 63*   TRIGLYCERIDES 58         Lab 09/14/21  0954   FOLATE >20.00   VITAMIN B 12 1,160*         Brief Urine Lab Results  (Last result in the past 365 days)      Color   Clarity   Blood   Leuk Est   Nitrite   Protein   CREAT   Urine HCG        09/14/21 1615 Dark Yellow Clear Negative Negative Positive Negative             Microbiology Results (last 10 days)     Procedure Component Value - Date/Time    COVID PRE-OP / PRE-PROCEDURE SCREENING ORDER (NO ISOLATION) - Swab, Nasopharynx [[id number]]  (Normal) Collected: 09/14/21 1113    Lab Status: Final result Specimen: Swab from Nasopharynx Updated: 09/14/21 1247    Narrative:      The following orders were created for panel order COVID PRE-OP / PRE-PROCEDURE SCREENING ORDER (NO ISOLATION) - Swab, Nasopharynx.  Procedure                               Abnormality         Status                     ---------                               -----------         ------                     COVID-19,CEPHEID/CHAS,LE...[[id number]]  Normal              Final result                 Please view results for these tests on the individual orders.     COVID-19,CEPHEID/CHAS,[facility]ALLIKA IN-HOUSE(OR EMERGENT/ADD-ON),NP SWAB IN TRANSPORT MEDIA 3-4 HR TAT - Swab, Nasopharynx [[id number]]  (Normal) Collected: 09/14/21 1113    Lab Status: Final result Specimen: Swab from Nasopharynx Updated: 09/14/21 1247     COVID19 Not Detected    Narrative:      Fact sheet for providers: https://www.fda.gov/media/538981/download     Fact sheet for patients: https://www.fda.gov/media/261378/download  Fact sheet for providers: https://www.fda.gov/media/805593/download    Fact sheet for patients: https://www.[facility] [facility].gov/media/838855/download    Test performed by PCR.          Adult Transthoracic Echo Complete W/ Cont if Necessary Per Protocol (With Agitated Saline)    Result Date: 9/14/2021  · Left ventricular ejection fraction appears to be 61 - 65%. Left ventricular systolic function is normal. · Left ventricular diastolic function is consistent with (grade Ia w/high LAP) impaired relaxation. · Saline test results are positive for evidence of a PFO. · Mild aortic valve regurgitation is present. · Moderate tricuspid valve regurgitation is present. · Estimated right ventricular systolic pressure from tricuspid regurgitation is mildly elevated (35-45 mmHg).      CT Head Without Contrast    Result Date: 9/15/2021  EXAMINATION: CT HEAD WO CONTRAST-09/14/2021:  INDICATION: Neuro deficit, acute, stroke suspected.  TECHNIQUE: CT head without intravenous contrast.  The radiation dose reduction device was turned on for each scan per the ALARA (As Low as Reasonably Achievable) protocol.  COMPARISON: NONE.  FINDINGS: The midline structures are symmetric without evidence of mass, mass effect or midline shift with only a small area of asymmetry in the left parietal region likely chronic small vessel ischemic disease versus prior insult with background moderate chronic small vessel ischemic disease. No intra-axial hemorrhage or extra-axial fluid collection. Globes and orbits are unremarkable. The paranasal  sinuses and mastoid air cells are grossly clear and well pneumatized. Calvarium intact.      No acute intracranial findings with at least moderately advanced senescent changes of chronic small vessel ischemic disease noted bilateral supratentorial structures. No intra-axial hemorrhage or extra-axial fluid collection.  Scan performed 09/14/2021 at 0802 hours. Scan report made available on 09/14/2021 at 0842 hours.  D:  09/14/2021 E:  09/14/2021    This report was finalized on 9/15/2021 6:37 PM by Dr. Carlos Young.      CT Angiogram Neck    Result Date: 9/15/2021  EXAMINATION: CT ANGIOGRAM NECK-, CT ANGIOGRAM HEAD W AI ANALYSIS OF LVO-09/14/2021:  INDICATION: Stroke, follow-up.  TECHNIQUE: CT angiogram head and neck with and without intravenous contrast administration. 2-D and 3-D reconstructions performed.  The radiation dose reduction device was turned on for each scan per the ALARA (As Low as Reasonably Achievable) protocol.  COMPARISON: NONE.  FINDINGS:  CTA NECK: Normal 3-vessel arch with patent great vessel origins despite minimal calcific disease greatest in the left subclavian takeoff without significant stenosis. Proximal subclavian arteries are patent. The vertebral arteries demonstrate grossly symmetric caliber without focal severe stenosis, aneurysm or occlusion. The carotids demonstrate grossly normal course and branching pattern with minimal plaque formation including calcific disease producing 10% right and 15% left luminal narrowing as measured by NASCET criteria with patency of the distal internal carotid arteries through the intracranial portions discussed further below. Cervical soft tissue is unremarkable without bulky cervical adenopathy. The thyroid is homogeneous. The lung apices demonstrate emphysematous involvement of centrilobular emphysema.  CTA HEAD: Distal internal carotid arteries without focal severe stenosis, aneurysm or occlusion. Anterior cerebral arteries are patent without  hemodynamically significant stenosis, aneurysm or occlusion. Middle cerebral arteries are patent without hemodynamically significant stenosis, aneurysm or occlusion. Vertebrobasilar system and posterior cerebral arteries are patent without hemodynamically significant stenosis, aneurysm or occlusion. Venous structures grossly unremarkable on limited evaluation with superior sagittal sinus widely patent.      Apart from minimal atherosclerotic calcific disease normal CTA head and neck without hemodynamically significant stenosis, aneurysm or occlusion.  D:  09/14/2021 E:  09/14/2021    This report was finalized on 9/15/2021 6:37 PM by Dr. Carlos Young.      MRI Brain Without Contrast    Result Date: 9/15/2021  EXAMINATION: MRI BRAIN WO CONTRAST-  INDICATION: Stroke, followup; I63.9-Cerebral infarction, unspecified; R47.1-Dysarthria and anarthria; R41.841-Cognitive communication deficit; R13.10-Dysphagia, unspecified.  TECHNIQUE: Multiplanar MRI of the brain without intravenous contrast.  COMPARISON: CT head and CT cerebral perfusion/angiogram performed earlier same day.  FINDINGS: Restricted diffusion in the bilateral frontoparietal regions greatest right frontal region series 3 image 62 of acute infarctions with localized edema in the right frontal lobe for example. No midline shift or hydrocephalus superimposed upon moderate to severe increased T2 and FLAIR signal findings in the periventricular and deep white matter chronic small vessel schema disease. Mild generalized atrophy. No T1 shortening focus or susceptibility artifact at any of these areas or elsewhere. Globes and orbits retain normal T2 signal characteristics. Paranasal sinuses and mastoid air cells are grossly clear and well pneumatized. No cerebellopontine angle mass lesion with normal signal flow voids of the distal internal carotid and vertebrobasilar arteries.      Acute infarctions within the bilateral frontoparietal regions greatest right frontal  lobe, however, multifocal involvement concerning for embolic etiology versus potential watershed appearance with minimal localized edema greatest in the right frontal lobe, however no midline shift or hydrocephalus superimposed upon moderate to severe background chronic small vessel ischemic disease.  D:  09/15/2021 E:  09/15/2021    This report was finalized on 9/15/2021 6:48 PM by Dr. Carlos Young.      Duplex Venous Lower Extremity - Bilateral CAR    Result Date: 9/15/2021  · Acute left lower extremity deep vein thrombosis noted in the mid femoral. · Patient appears to have a partial dual femoral venous system      CT Angiogram Head w AI Analysis of LVO    Result Date: 9/15/2021  EXAMINATION: CT ANGIOGRAM NECK-, CT ANGIOGRAM HEAD W AI ANALYSIS OF LVO-09/14/2021:  INDICATION: Stroke, follow-up.  TECHNIQUE: CT angiogram head and neck with and without intravenous contrast administration. 2-D and 3-D reconstructions performed.  The radiation dose reduction device was turned on for each scan per the ALARA (As Low as Reasonably Achievable) protocol.  COMPARISON: NONE.  FINDINGS:  CTA NECK: Normal 3-vessel arch with patent great vessel origins despite minimal calcific disease greatest in the left subclavian takeoff without significant stenosis. Proximal subclavian arteries are patent. The vertebral arteries demonstrate grossly symmetric caliber without focal severe stenosis, aneurysm or occlusion. The carotids demonstrate grossly normal course and branching pattern with minimal plaque formation including calcific disease producing 10% right and 15% left luminal narrowing as measured by NASCET criteria with patency of the distal internal carotid arteries through the intracranial portions discussed further below. Cervical soft tissue is unremarkable without bulky cervical adenopathy. The thyroid is homogeneous. The lung apices demonstrate emphysematous involvement of centrilobular emphysema.  CTA HEAD: Distal internal  carotid arteries without focal severe stenosis, aneurysm or occlusion. Anterior cerebral arteries are patent without hemodynamically significant stenosis, aneurysm or occlusion. Middle cerebral arteries are patent without hemodynamically significant stenosis, aneurysm or occlusion. Vertebrobasilar system and posterior cerebral arteries are patent without hemodynamically significant stenosis, aneurysm or occlusion. Venous structures grossly unremarkable on limited evaluation with superior sagittal sinus widely patent.      Apart from minimal atherosclerotic calcific disease normal CTA head and neck without hemodynamically significant stenosis, aneurysm or occlusion.  D:  09/14/2021 E:  09/14/2021    This report was finalized on 9/15/2021 6:37 PM by Dr. Carlos Young.      CT CEREBRAL PERFUSION WITH & WITHOUT CONTRAST    Result Date: 9/15/2021  EXAMINATION: CT CEREBRAL PERFUSION W WO CONTRAST-  INDICATION: Neuro deficit, acute, stroke suspected.  TECHNIQUE: CT cerebral perfusion with and without intravenous contrast administration. Multiple parametric maps including mean transit time, time to drain, cerebral blood flow and cerebral blood volume performed.  The radiation dose reduction device was turned on for each scan per the ALARA (As Low as Reasonably Achievable) protocol.  COMPARISON: None.  FINDINGS: Symmetric correlating parametric maps without perfusion defect identified specifically no reversible ischemia within a specific vascular territory.      No reversible ischemia within a specific vascular territory.  D:  09/14/2021 E:  09/14/2021   This report was finalized on 9/15/2021 6:37 PM by Dr. Carlos Young.        Results for orders placed during the hospital encounter of 09/14/21    Duplex Venous Lower Extremity - Bilateral CAR    Interpretation Summary  · Acute left lower extremity deep vein thrombosis noted in the mid femoral.  · Patient appears to have a partial dual femoral venous system      Results for  orders placed during the hospital encounter of 09/14/21    Duplex Venous Lower Extremity - Bilateral CAR    Interpretation Summary  · Acute left lower extremity deep vein thrombosis noted in the mid femoral.  · Patient appears to have a partial dual femoral venous system      Results for orders placed during the hospital encounter of 09/14/21    Adult Transthoracic Echo Complete W/ Cont if Necessary Per Protocol (With Agitated Saline)    Interpretation Summary  · Left ventricular ejection fraction appears to be 61 - 65%. Left ventricular systolic function is normal.  · Left ventricular diastolic function is consistent with (grade Ia w/high LAP) impaired relaxation.  · Saline test results are positive for evidence of a PFO.  · Mild aortic valve regurgitation is present.  · Moderate tricuspid valve regurgitation is present.  · Estimated right ventricular systolic pressure from tricuspid regurgitation is mildly elevated (35-45 mmHg).      Plan for Follow-up of Pending Labs/Results: to my in box  Pending Labs     Order Current Status    Vitamin B1, Whole Blood In process        Discharge Details        Discharge Medications      New Medications      Instructions Start Date   apixaban 5 MG tablet tablet  Commonly known as: ELIQUIS   5 mg, Oral, Every 12 Hours Scheduled      aspirin 81 MG chewable tablet   81 mg, Oral, Daily   Start Date: September 18, 2021     atorvastatin 80 MG tablet  Commonly known as: LIPITOR   80 mg, Oral, Nightly      nicotine 21 MG/24HR patch  Commonly known as: NICODERM CQ   1 patch, Transdermal, Every 24 Hours Scheduled   Start Date: September 18, 2021        Changes to Medications      Instructions Start Date   acetaminophen 325 MG tablet  Commonly known as: TYLENOL  What changed:   · medication strength  · how much to take  · when to take this   650 mg, Oral, Every 4 Hours PRN         Continue These Medications      Instructions Start Date   citalopram 20 MG tablet  Commonly known as: CeleXA    20 mg, Oral, Daily      omeprazole 20 MG capsule  Commonly known as: priLOSEC   20 mg, Oral, Daily      oxybutynin 5 MG tablet  Commonly known as: DITROPAN   5 mg, Oral, 3 Times Daily      traMADol 50 MG tablet  Commonly known as: ULTRAM   100 mg, Oral, Every 6 Hours PRN         Stop These Medications    B-12 COMPLIANCE INJECTION IJ     estrogens conjugated (synthetic A) 1.25 MG tablet  Commonly known as: CENESTIN     triamterene-hydrochlorothiazide 37.5-25 MG per tablet  Commonly known as: MAXZIDE-25            Allergies   Allergen Reactions   • Clindamycin/Lincomycin    • Milk-Related Compounds GI Intolerance   • Nsaids          Discharge Disposition:  Home or Self Care    Diet:  Hospital:  Diet Order   Procedures   • Diet Soft Texture; Chopped; Thin; Cardiac, Consistent Carbohydrate       Activity:      Restrictions or Other Recommendations:         CODE STATUS:    Code Status and Medical Interventions:   Ordered at: 09/14/21 0988     Level Of Support Discussed With:    Patient     Code Status:    CPR     Medical Interventions (Level of Support Prior to Arrest):    Full       No future appointments.    Additional Instructions for the Follow-ups that You Need to Schedule     Discharge Follow-up with Specialty: follow up with Cardiologist Monday as scheduled   As directed      Specialty: follow up with Cardiologist Monday as scheduled                     Selvin Crowell MD  09/17/21      Time Spent on Discharge:  I spent  45 minutes on this discharge activity which included: face-to-face encounter with the patient, reviewing the data in the system, coordination of the care with the nursing staff as well as consultants, documentation, and entering orders.            Electronically signed by Selvin Crowell MD at 09/17/21 7803

## 2021-09-17 NOTE — DISCHARGE PLACEMENT REQUEST
"Karyna Santiago (71 y.o. Female)     Date of Birth Social Security Number Address Home Phone MRN    1950  [address] [address] [phone] [id number]    [place] Marital Status          None        Admission Date Admission Type Admitting Provider Attending Provider Department, Room/Bed    9/14/21 Urgent Selvin Crowell MD Sloan, Walker E, MD [facility] 3F, S319/1    Discharge Date Discharge Disposition Discharge Destination         Home or Self Care              Attending Provider: Selvin Crowell MD    Allergies: Clindamycin/lincomycin, Milk-related Compounds, Nsaids    Isolation: None   Infection: None   Code Status: CPR    Ht: 157.5 cm (62.01\")   Wt: 55.2 kg (121 lb 11.1 oz)    Admission Cmt: None   Principal Problem: Stroke (CMS/[facility]) [I63.9] More...                 Active Insurance as of 9/14/2021     Primary Coverage     Payor Plan Insurance Group Employer/Plan Group    MEDICARE MEDICARE A & B      Payor Plan Address Payor Plan Phone Number Payor Plan Fax Number Effective Dates    [address] [phone]  6/1/2014 - None Entered    [address] [id number]       Subscriber Name Subscriber Birth Date Member ID       KARYNA SANTIAGO 1950 [id number]                 Emergency Contacts      (Rel.) Home Phone Work Phone Mobile Phone    Vitaly Romero (Brother) -- -- [phone]    aTlhaRomi (Friend) -- -- [phone]              Imaging Results (Most Recent)     Procedure Component Value Units Date/Time    MRI Brain Without Contrast [[id number]] Collected: 09/15/21 0730     Updated: 09/15/21 1851    Narrative:      EXAMINATION: MRI BRAIN WO CONTRAST-     INDICATION: Stroke, followup; I63.9-Cerebral infarction, unspecified;  R47.1-Dysarthria and anarthria; R41.841-Cognitive communication deficit;  R13.10-Dysphagia, unspecified.      TECHNIQUE: Multiplanar MRI of the brain without intravenous contrast.     COMPARISON: CT head and CT cerebral " perfusion/angiogram performed  earlier same day.     FINDINGS: Restricted diffusion in the bilateral frontoparietal regions  greatest right frontal region series 3 image 62 of acute infarctions  with localized edema in the right frontal lobe for example. No midline  shift or hydrocephalus superimposed upon moderate to severe increased T2  and FLAIR signal findings in the periventricular and deep white matter  chronic small vessel schema disease. Mild generalized atrophy. No T1  shortening focus or susceptibility artifact at any of these areas or  elsewhere. Globes and orbits retain normal T2 signal characteristics.  Paranasal sinuses and mastoid air cells are grossly clear and well  pneumatized. No cerebellopontine angle mass lesion with normal signal  flow voids of the distal internal carotid and vertebrobasilar arteries.       Impression:      Acute infarctions within the bilateral frontoparietal  regions greatest right frontal lobe, however, multifocal involvement  concerning for embolic etiology versus potential watershed appearance  with minimal localized edema greatest in the right frontal lobe, however  no midline shift or hydrocephalus superimposed upon moderate to severe  background chronic small vessel ischemic disease.     D:  09/15/2021  E:  09/15/2021         This report was finalized on 9/15/2021 6:48 PM by Dr. Carlos Young.       CT CEREBRAL PERFUSION WITH & WITHOUT CONTRAST [[id number]] Collected: 09/14/21 0848     Updated: 09/15/21 1840    Narrative:      EXAMINATION: CT CEREBRAL PERFUSION W WO CONTRAST-      INDICATION: Neuro deficit, acute, stroke suspected.      TECHNIQUE: CT cerebral perfusion with and without intravenous contrast  administration. Multiple parametric maps including mean transit time,  time to drain, cerebral blood flow and cerebral blood volume performed.     The radiation dose reduction device was turned on for each scan per the  ALARA (As Low as Reasonably Achievable)  protocol.     COMPARISON: None.     FINDINGS: Symmetric correlating parametric maps without perfusion defect  identified specifically no reversible ischemia within a specific  vascular territory.       Impression:      No reversible ischemia within a specific vascular territory.     D:  09/14/2021  E:  09/14/2021        This report was finalized on 9/15/2021 6:37 PM by Dr. Carlos Young.       CT Angiogram Head w AI Analysis of LVO [[id number]] Collected: 09/14/21 0849     Updated: 09/15/21 1840    Narrative:      EXAMINATION: CT ANGIOGRAM NECK-, CT ANGIOGRAM HEAD W AI ANALYSIS OF  LVO-09/14/2021:      INDICATION: Stroke, follow-up.      TECHNIQUE: CT angiogram head and neck with and without intravenous  contrast administration. 2-D and 3-D reconstructions performed.     The radiation dose reduction device was turned on for each scan per the  ALARA (As Low as Reasonably Achievable) protocol.     COMPARISON: NONE.     FINDINGS:      CTA NECK: Normal 3-vessel arch with patent great vessel origins despite  minimal calcific disease greatest in the left subclavian takeoff without  significant stenosis. Proximal subclavian arteries are patent. The  vertebral arteries demonstrate grossly symmetric caliber without focal  severe stenosis, aneurysm or occlusion. The carotids demonstrate grossly  normal course and branching pattern with minimal plaque formation  including calcific disease producing 10% right and 15% left luminal  narrowing as measured by NASCET criteria with patency of the distal  internal carotid arteries through the intracranial portions discussed  further below. Cervical soft tissue is unremarkable without bulky  cervical adenopathy. The thyroid is homogeneous. The lung apices  demonstrate emphysematous involvement of centrilobular emphysema.     CTA HEAD: Distal internal carotid arteries without focal severe  stenosis, aneurysm or occlusion. Anterior cerebral arteries are patent  without hemodynamically  significant stenosis, aneurysm or occlusion.  Middle cerebral arteries are patent without hemodynamically significant  stenosis, aneurysm or occlusion. Vertebrobasilar system and posterior  cerebral arteries are patent without hemodynamically significant  stenosis, aneurysm or occlusion. Venous structures grossly unremarkable  on limited evaluation with superior sagittal sinus widely patent.       Impression:      Apart from minimal atherosclerotic calcific disease normal  CTA head and neck without hemodynamically significant stenosis, aneurysm  or occlusion.     D:  09/14/2021  E:  09/14/2021           This report was finalized on 9/15/2021 6:37 PM by Dr. Carlos Young.       CT Angiogram Neck [[id number]] Collected: 09/14/21 0849     Updated: 09/15/21 1840    Narrative:      EXAMINATION: CT ANGIOGRAM NECK-, CT ANGIOGRAM HEAD W AI ANALYSIS OF  LVO-09/14/2021:      INDICATION: Stroke, follow-up.      TECHNIQUE: CT angiogram head and neck with and without intravenous  contrast administration. 2-D and 3-D reconstructions performed.     The radiation dose reduction device was turned on for each scan per the  ALARA (As Low as Reasonably Achievable) protocol.     COMPARISON: NONE.     FINDINGS:      CTA NECK: Normal 3-vessel arch with patent great vessel origins despite  minimal calcific disease greatest in the left subclavian takeoff without  significant stenosis. Proximal subclavian arteries are patent. The  vertebral arteries demonstrate grossly symmetric caliber without focal  severe stenosis, aneurysm or occlusion. The carotids demonstrate grossly  normal course and branching pattern with minimal plaque formation  including calcific disease producing 10% right and 15% left luminal  narrowing as measured by NASCET criteria with patency of the distal  internal carotid arteries through the intracranial portions discussed  further below. Cervical soft tissue is unremarkable without bulky  cervical adenopathy. The thyroid  is homogeneous. The lung apices  demonstrate emphysematous involvement of centrilobular emphysema.     CTA HEAD: Distal internal carotid arteries without focal severe  stenosis, aneurysm or occlusion. Anterior cerebral arteries are patent  without hemodynamically significant stenosis, aneurysm or occlusion.  Middle cerebral arteries are patent without hemodynamically significant  stenosis, aneurysm or occlusion. Vertebrobasilar system and posterior  cerebral arteries are patent without hemodynamically significant  stenosis, aneurysm or occlusion. Venous structures grossly unremarkable  on limited evaluation with superior sagittal sinus widely patent.       Impression:      Apart from minimal atherosclerotic calcific disease normal  CTA head and neck without hemodynamically significant stenosis, aneurysm  or occlusion.     D:  09/14/2021  E:  09/14/2021           This report was finalized on 9/15/2021 6:37 PM by Dr. Carlos Young.       CT Head Without Contrast [[id number]] Collected: 09/14/21 0847     Updated: 09/15/21 1840    Narrative:      EXAMINATION: CT HEAD WO CONTRAST-09/14/2021:      INDICATION: Neuro deficit, acute, stroke suspected.      TECHNIQUE: CT head without intravenous contrast.     The radiation dose reduction device was turned on for each scan per the  ALARA (As Low as Reasonably Achievable) protocol.     COMPARISON: NONE.     FINDINGS: The midline structures are symmetric without evidence of mass,  mass effect or midline shift with only a small area of asymmetry in the  left parietal region likely chronic small vessel ischemic disease versus  prior insult with background moderate chronic small vessel ischemic  disease. No intra-axial hemorrhage or extra-axial fluid collection.  Globes and orbits are unremarkable. The paranasal sinuses and mastoid  air cells are grossly clear and well pneumatized. Calvarium intact.       Impression:      No acute intracranial findings with at least  moderately  advanced senescent changes of chronic small vessel ischemic disease  noted bilateral supratentorial structures. No intra-axial hemorrhage or  extra-axial fluid collection.     Scan performed 2021 at 0802 hours. Scan report made available on  2021 at 0842 hours.     D:  2021  E:  2021           This report was finalized on 9/15/2021 6:37 PM by Dr. Carlos Young.              Discharge Summary      Selvin Crowell MD at 21 1338              [facility] Medicine Services  DISCHARGE SUMMARY    Patient Name: Karyna Santiago  : 1950  MRN: [id number]    Date of Admission: 2021  7:45 AM  Date of Discharge:  21  Primary Care Physician: Mason Miranda MD    Consults     Date and Time Order Name Status Description    2021  9:58 AM Inpatient Neurology Consult Stroke Completed           Hospital Course     Presenting Problem:   Stroke (CMS/HCC) [I63.9]    Active Hospital Problems    Diagnosis  POA   • **Stroke (CMS/HCC) [I63.9]  Yes   • Moderate malnutrition (CMS/HCC) [E44.0]  Yes   • Essential hypertension [I10]  Yes   • Tobacco abuse [Z72.0]  Yes      Resolved Hospital Problems    Diagnosis Date Resolved POA   • Hypokalemia [E87.6] 2021 Yes          Hospital Course:  Karyna Santiago is a 71 y.o. female fully vaccinated for COVID with PMH of HTN, osteoarthritis, gastric bypass, recent multiple rib fractures due to injury from a calf with incidental COVID19 positive test during that admission (at  in May 2021) and ongoing tobacco abuse presented to [facility] around 4am with left sided weakness. Pt was last known well around 9pm the night before when she fell asleep in her truck. She awoke at 330am with bilateral upper extremity paresthesias which subsequently started to affect her LUE strength.  She denies any h/o CVA but did note recent left arm numbness in May this year that went away on its own.       Acute Stroke  - MRI brain showed  acute infarctions within the bilateral frontoparietal regions, greatest right frontal lobe  - asa, high intensity statin  - Neurology evaluated Ms Santiago - estrogen therapy and ongoing tobacco abuse are modifiable risk factors that may have contributed to the stroke, cessation of both advised  - PFO noted on Echo, and a femoral DVT observed on LE Duplex -- heparin drip with eventual transition to eliquis therapy prior to discharge. First dose of eliquis given at 12:30 PM on the day of discharge with subsequent dosing at roughly 12 hours intervals recommended at rehab  - Patient has a low ROPE score, but will need followup with her primary Cardiologist Dr Narvaez to evaluate appropriateness for PFO closure.  Fortunately, she does have followup scheduled on Monday.  - continued LUE weakness at discharge, patient will be transferred to [facility] rehab today.     Tobacco abuse  - counseled cessation, provided nicotine replacement     DVT  - eliquis 5 mg q12, last dose in the hospital at 12:30 prior to transfer  - will need at least 6 months therapy       Discharge Follow Up Recommendations for outpatient labs/diagnostics:      Day of Discharge     HPI:   Feels better, more strength in upper arm today, still cant wiggle fingers on left hand.    Review of Systems  Gen- No fevers, chills  CV- No chest pain, palpitations  Resp- No cough, dyspnea  GI- No N/V/D, abd pain        Vital Signs:   Temp:  [97.7 °F (36.5 °C)-98.2 °F (36.8 °C)] 97.7 °F (36.5 °C)  Heart Rate:  [] 77  Resp:  [16-18] 16  BP: (115-117)/(60-77) 116/60     Physical Exam:  Constitutional - no acute distress, nontoxic, in bed, frail  HEENT-NCAT, mucous membranes moist  CV-RRR, S1 S2 normal, no m/r/g  Resp-CTAB  Abd-soft, nontender, nondistended, normoactive bowel sounds  Ext-No lower extremity cyanosis, clubbing or edema bilaterally  Neuro-alert and oriented, able to lift upper arm off pillow, unable to wiggle fingers on left hand  Psych-normal  affect   Skin- No rash on exposed UE or LE bilaterally      Pertinent  and/or Most Recent Results     LAB RESULTS:      Lab 09/17/21  0713 09/16/21  2318 09/16/21  1724 09/16/21  0849 09/16/21  0055 09/15/21  1828 09/15/21  1828 09/15/21  1211 09/14/21  0833   WBC 7.10  --   --   --   --   --  6.57 7.34 6.87   HEMOGLOBIN 12.8  --   --   --   --   --  13.0 13.2 13.7   HEMATOCRIT 40.0  --   --   --   --   --  41.4 44.2 44.3   PLATELETS 285  --   --   --   --   --  260 263 233   NEUTROS ABS  --   --   --   --   --   --  3.62  --  4.85   IMMATURE GRANS (ABS)  --   --   --   --   --   --  0.06*  --  0.02   LYMPHS ABS  --   --   --   --   --   --  1.84  --  1.23   MONOS ABS  --   --   --   --   --   --  0.87  --  0.66   EOS ABS  --   --   --   --   --   --  0.13  --  0.06   MCV 93.0  --   --   --   --   --  95.0 100.2* 95.1   PROTIME  --   --   --   --   --   --  13.1  --   --    APTT  --   --   --   --   --   --  28.1*  --   --    HEPARIN ANTI-XA 0.44 0.28* 0.45 0.53 0.66   < > 0.10*  --   --     < > = values in this interval not displayed.         Lab 09/17/21  0713 09/15/21  1211 09/14/21  2349 09/14/21  0918 09/14/21  0833   SODIUM 140 139  --  137  --    POTASSIUM 3.8 3.9 4.2 3.1*  --    CHLORIDE 108* 107  --  101  --    CO2 25.0 22.0  --  27.0  --    ANION GAP 7.0 10.0  --  9.0  --    BUN 14 13  --  13  --    CREATININE 0.61 0.61  --  0.62  --    GLUCOSE 75 97  --  98  --    CALCIUM 8.5* 9.0  --  8.6  --    HEMOGLOBIN A1C  --   --   --   --  5.40   TSH  --   --   --  0.567  --          Lab 09/14/21  0918   TOTAL PROTEIN 5.3*   ALBUMIN 3.00*   GLOBULIN 2.3   ALT (SGPT) 7   AST (SGOT) 13   BILIRUBIN 0.6   ALK PHOS 57         Lab 09/15/21  1828   PROTIME 13.1   INR 1.02         Lab 09/14/21  0918   CHOLESTEROL 134   LDL CHOL 59   HDL CHOL 63*   TRIGLYCERIDES 58         Lab 09/14/21  0954   FOLATE >20.00   VITAMIN B 12 1,160*         Brief Urine Lab Results  (Last result in the past 365 days)      Color   Clarity    Blood   Leuk Est   Nitrite   Protein   CREAT   Urine HCG        09/14/21 1615 Dark Yellow Clear Negative Negative Positive Negative             Microbiology Results (last 10 days)     Procedure Component Value - Date/Time    COVID PRE-OP / PRE-PROCEDURE SCREENING ORDER (NO ISOLATION) - Swab, Nasopharynx [[id number]]  (Normal) Collected: 09/14/21 1113    Lab Status: Final result Specimen: Swab from Nasopharynx Updated: 09/14/21 1247    Narrative:      The following orders were created for panel order COVID PRE-OP / PRE-PROCEDURE SCREENING ORDER (NO ISOLATION) - Swab, Nasopharynx.  Procedure                               Abnormality         Status                     ---------                               -----------         ------                     COVID-19,CEPHEID/CHAS,LE...[[id number]]  Normal              Final result                 Please view results for these tests on the individual orders.    COVID-19,CEPHEID/CHAS,MALLIKA IN-HOUSE(OR EMERGENT/ADD-ON),NP SWAB IN TRANSPORT MEDIA 3-4 HR TAT - Swab, Nasopharynx [[id number]]  (Normal) Collected: 09/14/21 1113    Lab Status: Final result Specimen: Swab from Nasopharynx Updated: 09/14/21 1247     COVID19 Not Detected    Narrative:      Fact sheet for providers: https://www.fda.gov/media/956468/download     Fact sheet for patients: https://www.fda.gov/media/234611/download  Fact sheet for providers: https://www.fda.gov/media/810037/download    Fact sheet for patients: https://www.fda.gov/media/860347/download    Test performed by PCR.          Adult Transthoracic Echo Complete W/ Cont if Necessary Per Protocol (With Agitated Saline)    Result Date: 9/14/2021  · Left ventricular ejection fraction appears to be 61 - 65%. Left ventricular systolic function is normal. · Left ventricular diastolic function is consistent with (grade Ia w/high LAP) impaired relaxation. · Saline test results are positive for evidence of a PFO. · Mild aortic valve regurgitation is present. ·  Moderate tricuspid valve regurgitation is present. · Estimated right ventricular systolic pressure from tricuspid regurgitation is mildly elevated (35-45 mmHg).      CT Head Without Contrast    Result Date: 9/15/2021  EXAMINATION: CT HEAD WO CONTRAST-09/14/2021:  INDICATION: Neuro deficit, acute, stroke suspected.  TECHNIQUE: CT head without intravenous contrast.  The radiation dose reduction device was turned on for each scan per the ALARA (As Low as Reasonably Achievable) protocol.  COMPARISON: NONE.  FINDINGS: The midline structures are symmetric without evidence of mass, mass effect or midline shift with only a small area of asymmetry in the left parietal region likely chronic small vessel ischemic disease versus prior insult with background moderate chronic small vessel ischemic disease. No intra-axial hemorrhage or extra-axial fluid collection. Globes and orbits are unremarkable. The paranasal sinuses and mastoid air cells are grossly clear and well pneumatized. Calvarium intact.      No acute intracranial findings with at least moderately advanced senescent changes of chronic small vessel ischemic disease noted bilateral supratentorial structures. No intra-axial hemorrhage or extra-axial fluid collection.  Scan performed 09/14/2021 at 0802 hours. Scan report made available on 09/14/2021 at 0842 hours.  D:  09/14/2021 E:  09/14/2021    This report was finalized on 9/15/2021 6:37 PM by Dr. Carlos Young.      CT Angiogram Neck    Result Date: 9/15/2021  EXAMINATION: CT ANGIOGRAM NECK-, CT ANGIOGRAM HEAD W AI ANALYSIS OF LVO-09/14/2021:  INDICATION: Stroke, follow-up.  TECHNIQUE: CT angiogram head and neck with and without intravenous contrast administration. 2-D and 3-D reconstructions performed.  The radiation dose reduction device was turned on for each scan per the ALARA (As Low as Reasonably Achievable) protocol.  COMPARISON: NONE.  FINDINGS:  CTA NECK: Normal 3-vessel arch with patent great vessel origins  despite minimal calcific disease greatest in the left subclavian takeoff without significant stenosis. Proximal subclavian arteries are patent. The vertebral arteries demonstrate grossly symmetric caliber without focal severe stenosis, aneurysm or occlusion. The carotids demonstrate grossly normal course and branching pattern with minimal plaque formation including calcific disease producing 10% right and 15% left luminal narrowing as measured by NASCET criteria with patency of the distal internal carotid arteries through the intracranial portions discussed further below. Cervical soft tissue is unremarkable without bulky cervical adenopathy. The thyroid is homogeneous. The lung apices demonstrate emphysematous involvement of centrilobular emphysema.  CTA HEAD: Distal internal carotid arteries without focal severe stenosis, aneurysm or occlusion. Anterior cerebral arteries are patent without hemodynamically significant stenosis, aneurysm or occlusion. Middle cerebral arteries are patent without hemodynamically significant stenosis, aneurysm or occlusion. Vertebrobasilar system and posterior cerebral arteries are patent without hemodynamically significant stenosis, aneurysm or occlusion. Venous structures grossly unremarkable on limited evaluation with superior sagittal sinus widely patent.      Apart from minimal atherosclerotic calcific disease normal CTA head and neck without hemodynamically significant stenosis, aneurysm or occlusion.  D:  09/14/2021 E:  09/14/2021    This report was finalized on 9/15/2021 6:37 PM by Dr. Carlos Young.      MRI Brain Without Contrast    Result Date: 9/15/2021  EXAMINATION: MRI BRAIN WO CONTRAST-  INDICATION: Stroke, followup; I63.9-Cerebral infarction, unspecified; R47.1-Dysarthria and anarthria; R41.841-Cognitive communication deficit; R13.10-Dysphagia, unspecified.  TECHNIQUE: Multiplanar MRI of the brain without intravenous contrast.  COMPARISON: CT head and CT cerebral  perfusion/angiogram performed earlier same day.  FINDINGS: Restricted diffusion in the bilateral frontoparietal regions greatest right frontal region series 3 image 62 of acute infarctions with localized edema in the right frontal lobe for example. No midline shift or hydrocephalus superimposed upon moderate to severe increased T2 and FLAIR signal findings in the periventricular and deep white matter chronic small vessel schema disease. Mild generalized atrophy. No T1 shortening focus or susceptibility artifact at any of these areas or elsewhere. Globes and orbits retain normal T2 signal characteristics. Paranasal sinuses and mastoid air cells are grossly clear and well pneumatized. No cerebellopontine angle mass lesion with normal signal flow voids of the distal internal carotid and vertebrobasilar arteries.      Acute infarctions within the bilateral frontoparietal regions greatest right frontal lobe, however, multifocal involvement concerning for embolic etiology versus potential watershed appearance with minimal localized edema greatest in the right frontal lobe, however no midline shift or hydrocephalus superimposed upon moderate to severe background chronic small vessel ischemic disease.  D:  09/15/2021 E:  09/15/2021    This report was finalized on 9/15/2021 6:48 PM by Dr. Carlos Young.      Duplex Venous Lower Extremity - Bilateral CAR    Result Date: 9/15/2021  · Acute left lower extremity deep vein thrombosis noted in the mid femoral. · Patient appears to have a partial dual femoral venous system      CT Angiogram Head w AI Analysis of LVO    Result Date: 9/15/2021  EXAMINATION: CT ANGIOGRAM NECK-, CT ANGIOGRAM HEAD W AI ANALYSIS OF LVO-09/14/2021:  INDICATION: Stroke, follow-up.  TECHNIQUE: CT angiogram head and neck with and without intravenous contrast administration. 2-D and 3-D reconstructions performed.  The radiation dose reduction device was turned on for each scan per the ALARA (As Low as  Reasonably Achievable) protocol.  COMPARISON: NONE.  FINDINGS:  CTA NECK: Normal 3-vessel arch with patent great vessel origins despite minimal calcific disease greatest in the left subclavian takeoff without significant stenosis. Proximal subclavian arteries are patent. The vertebral arteries demonstrate grossly symmetric caliber without focal severe stenosis, aneurysm or occlusion. The carotids demonstrate grossly normal course and branching pattern with minimal plaque formation including calcific disease producing 10% right and 15% left luminal narrowing as measured by NASCET criteria with patency of the distal internal carotid arteries through the intracranial portions discussed further below. Cervical soft tissue is unremarkable without bulky cervical adenopathy. The thyroid is homogeneous. The lung apices demonstrate emphysematous involvement of centrilobular emphysema.  CTA HEAD: Distal internal carotid arteries without focal severe stenosis, aneurysm or occlusion. Anterior cerebral arteries are patent without hemodynamically significant stenosis, aneurysm or occlusion. Middle cerebral arteries are patent without hemodynamically significant stenosis, aneurysm or occlusion. Vertebrobasilar system and posterior cerebral arteries are patent without hemodynamically significant stenosis, aneurysm or occlusion. Venous structures grossly unremarkable on limited evaluation with superior sagittal sinus widely patent.      Apart from minimal atherosclerotic calcific disease normal CTA head and neck without hemodynamically significant stenosis, aneurysm or occlusion.  D:  09/14/2021 E:  09/14/2021    This report was finalized on 9/15/2021 6:37 PM by Dr. Carlos Young.      CT CEREBRAL PERFUSION WITH & WITHOUT CONTRAST    Result Date: 9/15/2021  EXAMINATION: CT CEREBRAL PERFUSION W WO CONTRAST-  INDICATION: Neuro deficit, acute, stroke suspected.  TECHNIQUE: CT cerebral perfusion with and without intravenous contrast  administration. Multiple parametric maps including mean transit time, time to drain, cerebral blood flow and cerebral blood volume performed.  The radiation dose reduction device was turned on for each scan per the ALARA (As Low as Reasonably Achievable) protocol.  COMPARISON: None.  FINDINGS: Symmetric correlating parametric maps without perfusion defect identified specifically no reversible ischemia within a specific vascular territory.      No reversible ischemia within a specific vascular territory.  D:  09/14/2021 E:  09/14/2021   This report was finalized on 9/15/2021 6:37 PM by Dr. Carlos Young.        Results for orders placed during the hospital encounter of 09/14/21    Duplex Venous Lower Extremity - Bilateral CAR    Interpretation Summary  · Acute left lower extremity deep vein thrombosis noted in the mid femoral.  · Patient appears to have a partial dual femoral venous system      Results for orders placed during the hospital encounter of 09/14/21    Duplex Venous Lower Extremity - Bilateral CAR    Interpretation Summary  · Acute left lower extremity deep vein thrombosis noted in the mid femoral.  · Patient appears to have a partial dual femoral venous system      Results for orders placed during the hospital encounter of 09/14/21    Adult Transthoracic Echo Complete W/ Cont if Necessary Per Protocol (With Agitated Saline)    Interpretation Summary  · Left ventricular ejection fraction appears to be 61 - 65%. Left ventricular systolic function is normal.  · Left ventricular diastolic function is consistent with (grade Ia w/high LAP) impaired relaxation.  · Saline test results are positive for evidence of a PFO.  · Mild aortic valve regurgitation is present.  · Moderate tricuspid valve regurgitation is present.  · Estimated right ventricular systolic pressure from tricuspid regurgitation is mildly elevated (35-45 mmHg).      Plan for Follow-up of Pending Labs/Results: to my in box  Pending Labs     Order  Current Status    Vitamin B1, Whole Blood In process        Discharge Details        Discharge Medications      New Medications      Instructions Start Date   apixaban 5 MG tablet tablet  Commonly known as: ELIQUIS   5 mg, Oral, Every 12 Hours Scheduled      aspirin 81 MG chewable tablet   81 mg, Oral, Daily   Start Date: September 18, 2021     atorvastatin 80 MG tablet  Commonly known as: LIPITOR   80 mg, Oral, Nightly      nicotine 21 MG/24HR patch  Commonly known as: NICODERM CQ   1 patch, Transdermal, Every 24 Hours Scheduled   Start Date: September 18, 2021        Changes to Medications      Instructions Start Date   acetaminophen 325 MG tablet  Commonly known as: TYLENOL  What changed:   · medication strength  · how much to take  · when to take this   650 mg, Oral, Every 4 Hours PRN         Continue These Medications      Instructions Start Date   citalopram 20 MG tablet  Commonly known as: CeleXA   20 mg, Oral, Daily      omeprazole 20 MG capsule  Commonly known as: priLOSEC   20 mg, Oral, Daily      oxybutynin 5 MG tablet  Commonly known as: DITROPAN   5 mg, Oral, 3 Times Daily      traMADol 50 MG tablet  Commonly known as: ULTRAM   100 mg, Oral, Every 6 Hours PRN         Stop These Medications    B-12 COMPLIANCE INJECTION IJ     estrogens conjugated (synthetic A) 1.25 MG tablet  Commonly known as: CENESTIN     triamterene-hydrochlorothiazide 37.5-25 MG per tablet  Commonly known as: MAXZIDE-25            Allergies   Allergen Reactions   • Clindamycin/Lincomycin    • Milk-Related Compounds GI Intolerance   • Nsaids          Discharge Disposition:  Home or Self Care    Diet:  Hospital:  Diet Order   Procedures   • Diet Soft Texture; Chopped; Thin; Cardiac, Consistent Carbohydrate       Activity:      Restrictions or Other Recommendations:         CODE STATUS:    Code Status and Medical Interventions:   Ordered at: 09/14/21 0965     Level Of Support Discussed With:    Patient     Code Status:    CPR      Medical Interventions (Level of Support Prior to Arrest):    Full       No future appointments.    Additional Instructions for the Follow-ups that You Need to Schedule     Discharge Follow-up with Specialty: follow up with Cardiologist Monday as scheduled   As directed      Specialty: follow up with Cardiologist Monday as scheduled                     Selvin Crowell MD  09/17/21      Time Spent on Discharge:  I spent  45 minutes on this discharge activity which included: face-to-face encounter with the patient, reviewing the data in the system, coordination of the care with the nursing staff as well as consultants, documentation, and entering orders.            Electronically signed by Selvin Crowell MD at 09/17/21 6838

## 2021-09-17 NOTE — OUTREACH NOTE
Prep Survey      Responses   [facility] facility patient discharged from?  [facility]   Is LACE score < 7 ?  No   Emergency Room discharge w/ pulse ox?  No   Eligibility  Not Eligible   What are the reasons patient is not eligible?  [facility]   Does the patient have one of the following disease processes/diagnoses(primary or secondary)?  Other   Prep survey completed?  Yes          Eve Armenta RN

## 2021-09-17 NOTE — PLAN OF CARE
Goal Outcome Evaluation:  Plan of Care Reviewed With: patient        Progress: improving   SLP treatment completed. Will sign-off dysphagia. Please see note for further details and recommendations.

## 2021-09-17 NOTE — CASE MANAGEMENT/SOCIAL WORK
Case Management Discharge Note      Final Note: Plan is [facility]. [facility] van is scheduled for 14:30. Please put patient in for transport early enough to have her in the [address] lobby by 14:15. Nurse to call report to [phone] and fax discharge summary to [phone]         Selected Continued Care - Admitted Since 9/14/2021     Destination Coordination complete.    Service Provider Selected Services Address Phone Fax Patient Preferred    [facility]  Inpatient Rehabilitation [address] [id number] [phone] --          Durable Medical Equipment    No services have been selected for the patient.              Dialysis/Infusion    No services have been selected for the patient.              Home Medical Care    No services have been selected for the patient.              Therapy    No services have been selected for the patient.              Community Resources    No services have been selected for the patient.              Community & DME    No services have been selected for the patient.                       Final Discharge Disposition Code: 62 - inpatient rehab facility

## 2021-09-17 NOTE — DISCHARGE SUMMARY
[facility] Medicine Services  DISCHARGE SUMMARY    Patient Name: Karyna Santiago  : 1950  MRN: [id number]    Date of Admission: 2021  7:45 AM  Date of Discharge:  21  Primary Care Physician: Mason Miranda MD    Consults     Date and Time Order Name Status Description    2021  9:58 AM Inpatient Neurology Consult Stroke Completed           Hospital Course     Presenting Problem:   Stroke (CMS/HCC) [I63.9]    Active Hospital Problems    Diagnosis  POA   • **Stroke (CMS/HCC) [I63.9]  Yes   • Moderate malnutrition (CMS/HCC) [E44.0]  Yes   • Essential hypertension [I10]  Yes   • Tobacco abuse [Z72.0]  Yes      Resolved Hospital Problems    Diagnosis Date Resolved POA   • Hypokalemia [E87.6] 2021 Yes          Hospital Course:  Karyna Santiago is a 71 y.o. female fully vaccinated for COVID with PMH of HTN, osteoarthritis, gastric bypass, recent multiple rib fractures due to injury from a calf with incidental COVID19 positive test during that admission (at  in May 2021) and ongoing tobacco abuse presented to [facility] around 4am with left sided weakness. Pt was last known well around 9pm the night before when she fell asleep in her truck. She awoke at 330am with bilateral upper extremity paresthesias which subsequently started to affect her LUE strength.  She denies any h/o CVA but did note recent left arm numbness in May this year that went away on its own.       Acute Stroke  - MRI brain showed acute infarctions within the bilateral frontoparietal regions, greatest right frontal lobe  - asa, high intensity statin  - Neurology evaluated Ms Santiago - estrogen therapy and ongoing tobacco abuse are modifiable risk factors that may have contributed to the stroke, cessation of both advised  - PFO noted on Echo, and a femoral DVT observed on LE Duplex -- heparin drip with eventual transition to eliquis therapy prior to discharge. First dose of eliquis given at 12:30 PM on the  day of discharge with subsequent dosing at roughly 12 hours intervals recommended at rehab  - Patient has a low ROPE score, but will need followup with her primary Cardiologist Dr Narvaez to evaluate appropriateness for PFO closure.  Fortunately, she does have followup scheduled on Monday.  - continued LUE weakness at discharge, patient will be transferred to [facility] rehab today.     Tobacco abuse  - counseled cessation, provided nicotine replacement     DVT  - eliquis 5 mg q12, last dose in the hospital at 12:30 prior to transfer  - will need at least 6 months therapy       Discharge Follow Up Recommendations for outpatient labs/diagnostics:      Day of Discharge     HPI:   Feels better, more strength in upper arm today, still cant wiggle fingers on left hand.    Review of Systems  Gen- No fevers, chills  CV- No chest pain, palpitations  Resp- No cough, dyspnea  GI- No N/V/D, abd pain        Vital Signs:   Temp:  [97.7 °F (36.5 °C)-98.2 °F (36.8 °C)] 97.7 °F (36.5 °C)  Heart Rate:  [] 77  Resp:  [16-18] 16  BP: (115-117)/(60-77) 116/60     Physical Exam:  Constitutional - no acute distress, nontoxic, in bed, frail  HEENT-NCAT, mucous membranes moist  CV-RRR, S1 S2 normal, no m/r/g  Resp-CTAB  Abd-soft, nontender, nondistended, normoactive bowel sounds  Ext-No lower extremity cyanosis, clubbing or edema bilaterally  Neuro-alert and oriented, able to lift upper arm off pillow, unable to wiggle fingers on left hand  Psych-normal affect   Skin- No rash on exposed UE or LE bilaterally      Pertinent  and/or Most Recent Results     LAB RESULTS:      Lab 09/17/21  0713 09/16/21  2318 09/16/21  1724 09/16/21  0849 09/16/21  0055 09/15/21  1828 09/15/21  1828 09/15/21  1211 09/14/21  0833   WBC 7.10  --   --   --   --   --  6.57 7.34 6.87   HEMOGLOBIN 12.8  --   --   --   --   --  13.0 13.2 13.7   HEMATOCRIT 40.0  --   --   --   --   --  41.4 44.2 44.3   PLATELETS 285  --   --   --   --   --  503 379 257    NEUTROS ABS  --   --   --   --   --   --  3.62  --  4.85   IMMATURE GRANS (ABS)  --   --   --   --   --   --  0.06*  --  0.02   LYMPHS ABS  --   --   --   --   --   --  1.84  --  1.23   MONOS ABS  --   --   --   --   --   --  0.87  --  0.66   EOS ABS  --   --   --   --   --   --  0.13  --  0.06   MCV 93.0  --   --   --   --   --  95.0 100.2* 95.1   PROTIME  --   --   --   --   --   --  13.1  --   --    APTT  --   --   --   --   --   --  28.1*  --   --    HEPARIN ANTI-XA 0.44 0.28* 0.45 0.53 0.66   < > 0.10*  --   --     < > = values in this interval not displayed.         Lab 09/17/21  0713 09/15/21  1211 09/14/21  2349 09/14/21  0918 09/14/21  0833   SODIUM 140 139  --  137  --    POTASSIUM 3.8 3.9 4.2 3.1*  --    CHLORIDE 108* 107  --  101  --    CO2 25.0 22.0  --  27.0  --    ANION GAP 7.0 10.0  --  9.0  --    BUN 14 13  --  13  --    CREATININE 0.61 0.61  --  0.62  --    GLUCOSE 75 97  --  98  --    CALCIUM 8.5* 9.0  --  8.6  --    HEMOGLOBIN A1C  --   --   --   --  5.40   TSH  --   --   --  0.567  --          Lab 09/14/21 0918   TOTAL PROTEIN 5.3*   ALBUMIN 3.00*   GLOBULIN 2.3   ALT (SGPT) 7   AST (SGOT) 13   BILIRUBIN 0.6   ALK PHOS 57         Lab 09/15/21  1828   PROTIME 13.1   INR 1.02         Lab 09/14/21 0918   CHOLESTEROL 134   LDL CHOL 59   HDL CHOL 63*   TRIGLYCERIDES 58         Lab 09/14/21  0954   FOLATE >20.00   VITAMIN B 12 1,160*         Brief Urine Lab Results  (Last result in the past 365 days)      Color   Clarity   Blood   Leuk Est   Nitrite   Protein   CREAT   Urine HCG        09/14/21 1615 Dark Yellow Clear Negative Negative Positive Negative             Microbiology Results (last 10 days)     Procedure Component Value - Date/Time    COVID PRE-OP / PRE-PROCEDURE SCREENING ORDER (NO ISOLATION) - Swab, Nasopharynx [[id number]]  (Normal) Collected: 09/14/21 1113    Lab Status: Final result Specimen: Swab from Nasopharynx Updated: 09/14/21 1247    Narrative:      The following orders were  created for panel order COVID PRE-OP / PRE-PROCEDURE SCREENING ORDER (NO ISOLATION) - Swab, Nasopharynx.  Procedure                               Abnormality         Status                     ---------                               -----------         ------                     COVID-19,CEPHEID/CHAS,LE...[[id number]]  Normal              Final result                 Please view results for these tests on the individual orders.    COVID-19,CEPHEID/CHAS,MALLIKA IN-HOUSE(OR EMERGENT/ADD-ON),NP SWAB IN TRANSPORT MEDIA 3-4 HR TAT - Swab, Nasopharynx [[id number]]  (Normal) Collected: 09/14/21 1113    Lab Status: Final result Specimen: Swab from Nasopharynx Updated: 09/14/21 1247     COVID19 Not Detected    Narrative:      Fact sheet for providers: https://www.fda.gov/media/530789/download     Fact sheet for patients: https://www.fda.gov/media/376583/download  Fact sheet for providers: https://www.fda.gov/media/816596/download    Fact sheet for patients: https://www.fda.gov/media/769375/download    Test performed by PCR.          Adult Transthoracic Echo Complete W/ Cont if Necessary Per Protocol (With Agitated Saline)    Result Date: 9/14/2021  · Left ventricular ejection fraction appears to be 61 - 65%. Left ventricular systolic function is normal. · Left ventricular diastolic function is consistent with (grade Ia w/high LAP) impaired relaxation. · Saline test results are positive for evidence of a PFO. · Mild aortic valve regurgitation is present. · Moderate tricuspid valve regurgitation is present. · Estimated right ventricular systolic pressure from tricuspid regurgitation is mildly elevated (35-45 mmHg).      CT Head Without Contrast    Result Date: 9/15/2021  EXAMINATION: CT HEAD WO CONTRAST-09/14/2021:  INDICATION: Neuro deficit, acute, stroke suspected.  TECHNIQUE: CT head without intravenous contrast.  The radiation dose reduction device was turned on for each scan per the ALARA (As Low as Reasonably Achievable)  protocol.  COMPARISON: NONE.  FINDINGS: The midline structures are symmetric without evidence of mass, mass effect or midline shift with only a small area of asymmetry in the left parietal region likely chronic small vessel ischemic disease versus prior insult with background moderate chronic small vessel ischemic disease. No intra-axial hemorrhage or extra-axial fluid collection. Globes and orbits are unremarkable. The paranasal sinuses and mastoid air cells are grossly clear and well pneumatized. Calvarium intact.      No acute intracranial findings with at least moderately advanced senescent changes of chronic small vessel ischemic disease noted bilateral supratentorial structures. No intra-axial hemorrhage or extra-axial fluid collection.  Scan performed 09/14/2021 at 0802 hours. Scan report made available on 09/14/2021 at 0842 hours.  D:  09/14/2021 E:  09/14/2021    This report was finalized on 9/15/2021 6:37 PM by Dr. Carlos Young.      CT Angiogram Neck    Result Date: 9/15/2021  EXAMINATION: CT ANGIOGRAM NECK-, CT ANGIOGRAM HEAD W AI ANALYSIS OF LVO-09/14/2021:  INDICATION: Stroke, follow-up.  TECHNIQUE: CT angiogram head and neck with and without intravenous contrast administration. 2-D and 3-D reconstructions performed.  The radiation dose reduction device was turned on for each scan per the ALARA (As Low as Reasonably Achievable) protocol.  COMPARISON: NONE.  FINDINGS:  CTA NECK: Normal 3-vessel arch with patent great vessel origins despite minimal calcific disease greatest in the left subclavian takeoff without significant stenosis. Proximal subclavian arteries are patent. The vertebral arteries demonstrate grossly symmetric caliber without focal severe stenosis, aneurysm or occlusion. The carotids demonstrate grossly normal course and branching pattern with minimal plaque formation including calcific disease producing 10% right and 15% left luminal narrowing as measured by NASCET criteria with patency  of the distal internal carotid arteries through the intracranial portions discussed further below. Cervical soft tissue is unremarkable without bulky cervical adenopathy. The thyroid is homogeneous. The lung apices demonstrate emphysematous involvement of centrilobular emphysema.  CTA HEAD: Distal internal carotid arteries without focal severe stenosis, aneurysm or occlusion. Anterior cerebral arteries are patent without hemodynamically significant stenosis, aneurysm or occlusion. Middle cerebral arteries are patent without hemodynamically significant stenosis, aneurysm or occlusion. Vertebrobasilar system and posterior cerebral arteries are patent without hemodynamically significant stenosis, aneurysm or occlusion. Venous structures grossly unremarkable on limited evaluation with superior sagittal sinus widely patent.      Apart from minimal atherosclerotic calcific disease normal CTA head and neck without hemodynamically significant stenosis, aneurysm or occlusion.  D:  09/14/2021 E:  09/14/2021    This report was finalized on 9/15/2021 6:37 PM by Dr. Carlos Young.      MRI Brain Without Contrast    Result Date: 9/15/2021  EXAMINATION: MRI BRAIN WO CONTRAST-  INDICATION: Stroke, followup; I63.9-Cerebral infarction, unspecified; R47.1-Dysarthria and anarthria; R41.841-Cognitive communication deficit; R13.10-Dysphagia, unspecified.  TECHNIQUE: Multiplanar MRI of the brain without intravenous contrast.  COMPARISON: CT head and CT cerebral perfusion/angiogram performed earlier same day.  FINDINGS: Restricted diffusion in the bilateral frontoparietal regions greatest right frontal region series 3 image 62 of acute infarctions with localized edema in the right frontal lobe for example. No midline shift or hydrocephalus superimposed upon moderate to severe increased T2 and FLAIR signal findings in the periventricular and deep white matter chronic small vessel schema disease. Mild generalized atrophy. No T1 shortening  focus or susceptibility artifact at any of these areas or elsewhere. Globes and orbits retain normal T2 signal characteristics. Paranasal sinuses and mastoid air cells are grossly clear and well pneumatized. No cerebellopontine angle mass lesion with normal signal flow voids of the distal internal carotid and vertebrobasilar arteries.      Acute infarctions within the bilateral frontoparietal regions greatest right frontal lobe, however, multifocal involvement concerning for embolic etiology versus potential watershed appearance with minimal localized edema greatest in the right frontal lobe, however no midline shift or hydrocephalus superimposed upon moderate to severe background chronic small vessel ischemic disease.  D:  09/15/2021 E:  09/15/2021    This report was finalized on 9/15/2021 6:48 PM by Dr. Carlos Young.      Duplex Venous Lower Extremity - Bilateral CAR    Result Date: 9/15/2021  · Acute left lower extremity deep vein thrombosis noted in the mid femoral. · Patient appears to have a partial dual femoral venous system      CT Angiogram Head w AI Analysis of LVO    Result Date: 9/15/2021  EXAMINATION: CT ANGIOGRAM NECK-, CT ANGIOGRAM HEAD W AI ANALYSIS OF LVO-09/14/2021:  INDICATION: Stroke, follow-up.  TECHNIQUE: CT angiogram head and neck with and without intravenous contrast administration. 2-D and 3-D reconstructions performed.  The radiation dose reduction device was turned on for each scan per the ALARA (As Low as Reasonably Achievable) protocol.  COMPARISON: NONE.  FINDINGS:  CTA NECK: Normal 3-vessel arch with patent great vessel origins despite minimal calcific disease greatest in the left subclavian takeoff without significant stenosis. Proximal subclavian arteries are patent. The vertebral arteries demonstrate grossly symmetric caliber without focal severe stenosis, aneurysm or occlusion. The carotids demonstrate grossly normal course and branching pattern with minimal plaque formation  including calcific disease producing 10% right and 15% left luminal narrowing as measured by NASCET criteria with patency of the distal internal carotid arteries through the intracranial portions discussed further below. Cervical soft tissue is unremarkable without bulky cervical adenopathy. The thyroid is homogeneous. The lung apices demonstrate emphysematous involvement of centrilobular emphysema.  CTA HEAD: Distal internal carotid arteries without focal severe stenosis, aneurysm or occlusion. Anterior cerebral arteries are patent without hemodynamically significant stenosis, aneurysm or occlusion. Middle cerebral arteries are patent without hemodynamically significant stenosis, aneurysm or occlusion. Vertebrobasilar system and posterior cerebral arteries are patent without hemodynamically significant stenosis, aneurysm or occlusion. Venous structures grossly unremarkable on limited evaluation with superior sagittal sinus widely patent.      Apart from minimal atherosclerotic calcific disease normal CTA head and neck without hemodynamically significant stenosis, aneurysm or occlusion.  D:  09/14/2021 E:  09/14/2021    This report was finalized on 9/15/2021 6:37 PM by Dr. Carlos Young.      CT CEREBRAL PERFUSION WITH & WITHOUT CONTRAST    Result Date: 9/15/2021  EXAMINATION: CT CEREBRAL PERFUSION W WO CONTRAST-  INDICATION: Neuro deficit, acute, stroke suspected.  TECHNIQUE: CT cerebral perfusion with and without intravenous contrast administration. Multiple parametric maps including mean transit time, time to drain, cerebral blood flow and cerebral blood volume performed.  The radiation dose reduction device was turned on for each scan per the ALARA (As Low as Reasonably Achievable) protocol.  COMPARISON: None.  FINDINGS: Symmetric correlating parametric maps without perfusion defect identified specifically no reversible ischemia within a specific vascular territory.      No reversible ischemia within a specific  vascular territory.  D:  09/14/2021 E:  09/14/2021   This report was finalized on 9/15/2021 6:37 PM by Dr. Carlos Young.        Results for orders placed during the hospital encounter of 09/14/21    Duplex Venous Lower Extremity - Bilateral CAR    Interpretation Summary  · Acute left lower extremity deep vein thrombosis noted in the mid femoral.  · Patient appears to have a partial dual femoral venous system      Results for orders placed during the hospital encounter of 09/14/21    Duplex Venous Lower Extremity - Bilateral CAR    Interpretation Summary  · Acute left lower extremity deep vein thrombosis noted in the mid femoral.  · Patient appears to have a partial dual femoral venous system      Results for orders placed during the hospital encounter of 09/14/21    Adult Transthoracic Echo Complete W/ Cont if Necessary Per Protocol (With Agitated Saline)    Interpretation Summary  · Left ventricular ejection fraction appears to be 61 - 65%. Left ventricular systolic function is normal.  · Left ventricular diastolic function is consistent with (grade Ia w/high LAP) impaired relaxation.  · Saline test results are positive for evidence of a PFO.  · Mild aortic valve regurgitation is present.  · Moderate tricuspid valve regurgitation is present.  · Estimated right ventricular systolic pressure from tricuspid regurgitation is mildly elevated (35-45 mmHg).      Plan for Follow-up of Pending Labs/Results: to my in box  Pending Labs     Order Current Status    Vitamin B1, Whole Blood In process        Discharge Details        Discharge Medications      New Medications      Instructions Start Date   apixaban 5 MG tablet tablet  Commonly known as: ELIQUIS   5 mg, Oral, Every 12 Hours Scheduled      aspirin 81 MG chewable tablet   81 mg, Oral, Daily   Start Date: September 18, 2021     atorvastatin 80 MG tablet  Commonly known as: LIPITOR   80 mg, Oral, Nightly      nicotine 21 MG/24HR patch  Commonly known as: NICODERM CQ    1 patch, Transdermal, Every 24 Hours Scheduled   Start Date: September 18, 2021        Changes to Medications      Instructions Start Date   acetaminophen 325 MG tablet  Commonly known as: TYLENOL  What changed:   · medication strength  · how much to take  · when to take this   650 mg, Oral, Every 4 Hours PRN         Continue These Medications      Instructions Start Date   citalopram 20 MG tablet  Commonly known as: CeleXA   20 mg, Oral, Daily      omeprazole 20 MG capsule  Commonly known as: priLOSEC   20 mg, Oral, Daily      oxybutynin 5 MG tablet  Commonly known as: DITROPAN   5 mg, Oral, 3 Times Daily      traMADol 50 MG tablet  Commonly known as: ULTRAM   100 mg, Oral, Every 6 Hours PRN         Stop These Medications    B-12 COMPLIANCE INJECTION IJ     estrogens conjugated (synthetic A) 1.25 MG tablet  Commonly known as: CENESTIN     triamterene-hydrochlorothiazide 37.5-25 MG per tablet  Commonly known as: MAXZIDE-25            Allergies   Allergen Reactions   • Clindamycin/Lincomycin    • Milk-Related Compounds GI Intolerance   • Nsaids          Discharge Disposition:  Home or Self Care    Diet:  Hospital:  Diet Order   Procedures   • Diet Soft Texture; Chopped; Thin; Cardiac, Consistent Carbohydrate       Activity:      Restrictions or Other Recommendations:         CODE STATUS:    Code Status and Medical Interventions:   Ordered at: 09/14/21 0958     Level Of Support Discussed With:    Patient     Code Status:    CPR     Medical Interventions (Level of Support Prior to Arrest):    Full       No future appointments.    Additional Instructions for the Follow-ups that You Need to Schedule     Discharge Follow-up with Specialty: follow up with Cardiologist Monday as scheduled   As directed      Specialty: follow up with Cardiologist Monday as scheduled                     Selvin Crowell MD  09/17/21      Time Spent on Discharge:  I spent  45 minutes on this discharge activity which included: face-to-face  encounter with the patient, reviewing the data in the system, coordination of the care with the nursing staff as well as consultants, documentation, and entering orders.

## 2021-09-20 LAB — VIT B1 BLD-SCNC: 147.2 NMOL/L (ref 66.5–200)

## 2022-08-15 NOTE — PROGRESS NOTES
Patient: Karyna Santiago    YOB: 1950    Date: 08/16/2022    Primary Care Provider: Mason Miranda MD    Chief Complaint   Patient presents with   • Diarrhea     Weight loss       SUBJECTIVE:    History of present illness:  Patient has a history significant for COPD, she is s/p CVA and she has had left femoral DVT.  Patient is on long-term Eliquis.  I saw the patient in the office today as a consultation for evaluation and treatment of bouts of diarrhea and unexplained weight loss. Patient states she has not had that much diarrhea, but is constipated most of the time.     She does have unexplained weight loss of 25 pounds over the past month or so.  She no longer takes her Eliquis.  She did have a previous Patricia-en-Y gastric bypass in 1980 and did lose 120 pounds at that time.  She has not had a previous colonoscopy for the last 10 years.    The following portions of the patient's history were reviewed and updated as appropriate: allergies, current medications, past family history, past medical history, past social history, past surgical history and problem list.    Review of Systems   Constitutional: Positive for unexpected weight change. Negative for activity change, chills and fever.   HENT: Negative for hearing loss, trouble swallowing and voice change.    Eyes: Negative for visual disturbance.   Respiratory: Negative for apnea, cough, chest tightness, shortness of breath and wheezing.    Cardiovascular: Negative for chest pain, palpitations and leg swelling.   Gastrointestinal: Positive for abdominal pain, constipation, diarrhea, nausea and rectal pain. Negative for abdominal distention, anal bleeding, blood in stool and vomiting.   Endocrine: Negative for cold intolerance and heat intolerance.   Genitourinary: Negative for difficulty urinating, dysuria and flank pain.   Musculoskeletal: Negative for back pain and gait problem.   Skin: Negative for color change, rash and wound.   Neurological:  Negative for dizziness, syncope, speech difficulty, weakness, light-headedness, numbness and headaches.   Hematological: Negative for adenopathy. Does not bruise/bleed easily.   Psychiatric/Behavioral: Negative for confusion. The patient is not nervous/anxious.        History:  Past Medical History:   Diagnosis Date   • Anemia    • Arthritis    • Blood disease    • Headache    • History of transfusion    • Hypertension    • Stroke (HCC)        Past Surgical History:   Procedure Laterality Date   • GASTRIC BYPASS         Family History   Problem Relation Age of Onset   • Stroke Father        Social History     Tobacco Use   • Smoking status: Former Smoker   • Smokeless tobacco: Never Used   Vaping Use   • Vaping Use: Never used   Substance Use Topics   • Alcohol use: Never   • Drug use: Defer       Allergies:  Allergies   Allergen Reactions   • Clindamycin/Lincomycin    • Milk-Related Compounds GI Intolerance   • Nsaids        Medications:    Current Outpatient Medications:   •  acetaminophen (TYLENOL) 325 MG tablet, Take 2 tablets by mouth Every 4 (Four) Hours As Needed for Mild Pain ., Disp: , Rfl:   •  atorvastatin (LIPITOR) 80 MG tablet, Take 1 tablet by mouth Every Night., Disp: 30 tablet, Rfl: 0  •  citalopram (CeleXA) 20 MG tablet, Take 20 mg by mouth Daily., Disp: , Rfl:   •  nicotine (NICODERM CQ) 21 MG/24HR patch, Place 1 patch on the skin as directed by provider Daily., Disp: , Rfl:   •  omeprazole (priLOSEC) 20 MG capsule, Take 20 mg by mouth Daily., Disp: , Rfl:   •  oxybutynin (DITROPAN) 5 MG tablet, Take 5 mg by mouth 3 (Three) Times a Day., Disp: , Rfl:   •  traMADol (ULTRAM) 50 MG tablet, Take 2 tablets by mouth Every 6 (Six) Hours As Needed for Moderate Pain ., Disp: 6 tablet, Rfl: 0  •  apixaban (ELIQUIS) 5 MG tablet tablet, Take 1 tablet by mouth Every 12 (Twelve) Hours. Indications: DVT/PE (active thrombosis), Disp: 60 tablet, Rfl: 0  •  aspirin 81 MG chewable tablet, Chew 1 tablet Daily., Disp:  ", Rfl:   •  Breo Ellipta 100-25 MCG/INH inhaler, Inhale 1 puff Daily., Disp: , Rfl:   •  cyanocobalamin 1000 MCG/ML injection, INJECT 1 ML ONCE A WEEK, Disp: , Rfl:   •  furosemide (LASIX) 20 MG tablet, Take 20 mg by mouth Daily., Disp: , Rfl:   •  levOCARNitine 250 MG capsule, Take 250 mg by mouth 3 (Three) Times a Day., Disp: , Rfl:   •  sennosides-docusate (PERICOLACE) 8.6-50 MG per tablet, Take 1 tablet by mouth Daily., Disp: , Rfl:   •  tolterodine LA (DETROL LA) 4 MG 24 hr capsule, Take 4 mg by mouth Daily., Disp: , Rfl:   •  Ventolin  (90 Base) MCG/ACT inhaler, INHALE 1 TO 2 PUFFS IN MOUTH EVERY 4 TO 6 HOURS AS NEEDED, Disp: , Rfl:     OBJECTIVE:    Vital Signs:   Vitals:    08/16/22 1523   BP: 128/82   Pulse: 71   Resp: 16   Temp: 97.4 °F (36.3 °C)   TempSrc: Temporal   SpO2: 98%   Weight: 43.2 kg (95 lb 3.2 oz)   Height: 157.5 cm (62.01\")       Physical Exam:   General Appearance:    Alert, cooperative, in no acute distress very thin and cachectic in appearance   Head:    Normocephalic, without obvious abnormality, atraumatic   Eyes:            Lids and lashes normal, conjunctivae and sclerae normal, no   icterus, no pallor, corneas clear, PERRL   Ears:    Ears appear intact with no abnormalities noted   Throat:   No oral lesions, no thrush, oral mucosa moist   Neck:   No adenopathy, supple, trachea midline, no thyromegaly,  no JVD   Lungs:     Clear to auscultation,respirations regular, even and                  unlabored    Heart:    Regular rhythm and normal rate, normal S1 and S2, no            murmur   Abdomen:     no masses, no organomegaly, soft non-tender, non-distended, no guarding, there is no evidence of tenderness   Extremities:   Moves all extremities well, no edema, no cyanosis, no             redness   Pulses:   Pulses palpable and equal bilaterally   Skin:   No bleeding, bruising or rash   Lymph nodes:   No palpable adenopathy   Neurologic:   Cranial nerves 2 - 12 grossly intact, " sensation intact      Results Review:   I reviewed the patient's new clinical results.  I reviewed the patient's new imaging results and agree with the interpretation.  I reviewed the patient's other test results and agree with the interpretation    Review of Systems was reviewed and confirmed as accurate as documented by the MA.    ASSESSMENT/PLAN:    1. Weight loss        I recommend a upper endoscopy for further evaluation. The procedure was explained as well as the risks which include but are not limited to bleeding, infection, perforation, abdominal pain etc. The patient understands these risks and the procedure and wishes to proceed.     She will need future colonoscopy.    Electronically signed by Abimael Menchaca MD  08/16/22 15:08 EDT

## 2022-08-16 ENCOUNTER — OFFICE VISIT (OUTPATIENT)
Dept: SURGERY | Facility: CLINIC | Age: 72
End: 2022-08-16

## 2022-08-16 VITALS
SYSTOLIC BLOOD PRESSURE: 128 MMHG | DIASTOLIC BLOOD PRESSURE: 82 MMHG | OXYGEN SATURATION: 98 % | HEIGHT: 62 IN | WEIGHT: 95.2 LBS | HEART RATE: 71 BPM | BODY MASS INDEX: 17.52 KG/M2 | RESPIRATION RATE: 16 BRPM | TEMPERATURE: 97.4 F

## 2022-08-16 DIAGNOSIS — R63.4 WEIGHT LOSS: Primary | ICD-10-CM

## 2022-08-16 DIAGNOSIS — Z01.818 PRE-OP TESTING: Primary | ICD-10-CM

## 2022-08-16 PROCEDURE — 99203 OFFICE O/P NEW LOW 30 MIN: CPT | Performed by: SURGERY

## 2022-08-16 RX ORDER — FUROSEMIDE 20 MG/1
20 TABLET ORAL DAILY
COMMUNITY
Start: 2022-07-01

## 2022-08-16 RX ORDER — CYANOCOBALAMIN 1000 UG/ML
INJECTION, SOLUTION INTRAMUSCULAR; SUBCUTANEOUS
COMMUNITY
Start: 2022-07-13

## 2022-08-16 RX ORDER — AMOXICILLIN 250 MG
1 CAPSULE ORAL DAILY
COMMUNITY

## 2022-08-16 RX ORDER — ALBUTEROL SULFATE 90 UG/1
AEROSOL, METERED RESPIRATORY (INHALATION)
COMMUNITY
Start: 2022-06-30

## 2022-08-16 RX ORDER — TOLTERODINE 4 MG/1
4 CAPSULE, EXTENDED RELEASE ORAL DAILY
COMMUNITY

## 2022-08-16 RX ORDER — LEVOCARNITINE TARTRATE 250 MG
250 CAPSULE ORAL 3 TIMES DAILY
COMMUNITY

## 2022-08-24 PROBLEM — R63.4 WEIGHT LOSS: Status: ACTIVE | Noted: 2022-08-24

## 2022-09-07 ENCOUNTER — TELEPHONE (OUTPATIENT)
Dept: SURGERY | Facility: CLINIC | Age: 72
End: 2022-09-07

## 2022-09-07 NOTE — TELEPHONE ENCOUNTER
Called patient to confirm EGD, 09/12/22, Dr Menchaca @ [facility] no answer,left voice message

## 2022-09-12 ENCOUNTER — HOSPITAL ENCOUNTER (OUTPATIENT)
Facility: HOSPITAL | Age: 72
Setting detail: HOSPITAL OUTPATIENT SURGERY
Discharge: HOME | End: 2022-09-12
Attending: SURGERY | Admitting: SURGERY

## 2022-09-12 ENCOUNTER — ANESTHESIA (OUTPATIENT)
Dept: GASTROENTEROLOGY | Facility: HOSPITAL | Age: 72
End: 2022-09-12

## 2022-09-12 ENCOUNTER — ANESTHESIA EVENT (OUTPATIENT)
Dept: GASTROENTEROLOGY | Facility: HOSPITAL | Age: 72
End: 2022-09-12

## 2022-09-12 VITALS
OXYGEN SATURATION: 95 % | TEMPERATURE: 97.6 F | RESPIRATION RATE: 16 BRPM | SYSTOLIC BLOOD PRESSURE: 136 MMHG | DIASTOLIC BLOOD PRESSURE: 74 MMHG | HEART RATE: 64 BPM

## 2022-09-12 DIAGNOSIS — R63.4 WEIGHT LOSS: ICD-10-CM

## 2022-09-12 PROCEDURE — 43239 EGD BIOPSY SINGLE/MULTIPLE: CPT | Performed by: SURGERY

## 2022-09-12 PROCEDURE — 88305 TISSUE EXAM BY PATHOLOGIST: CPT

## 2022-09-12 PROCEDURE — 25010000002 PROPOFOL 1000 MG/100ML EMULSION: Performed by: NURSE ANESTHETIST, CERTIFIED REGISTERED

## 2022-09-12 RX ORDER — SODIUM CHLORIDE 0.9 % (FLUSH) 0.9 %
10 SYRINGE (ML) INJECTION AS NEEDED
Status: DISCONTINUED | OUTPATIENT
Start: 2022-09-12 | End: 2022-09-12 | Stop reason: HOSPADM

## 2022-09-12 RX ORDER — PROPOFOL 10 MG/ML
INJECTION, EMULSION INTRAVENOUS AS NEEDED
Status: DISCONTINUED | OUTPATIENT
Start: 2022-09-12 | End: 2022-09-12 | Stop reason: SURG

## 2022-09-12 RX ORDER — LIDOCAINE HYDROCHLORIDE 20 MG/ML
INJECTION, SOLUTION INTRAVENOUS AS NEEDED
Status: DISCONTINUED | OUTPATIENT
Start: 2022-09-12 | End: 2022-09-12 | Stop reason: SURG

## 2022-09-12 RX ORDER — MAGNESIUM HYDROXIDE 1200 MG/15ML
LIQUID ORAL AS NEEDED
Status: DISCONTINUED | OUTPATIENT
Start: 2022-09-12 | End: 2022-09-12 | Stop reason: HOSPADM

## 2022-09-12 RX ORDER — SODIUM CHLORIDE, SODIUM LACTATE, POTASSIUM CHLORIDE, CALCIUM CHLORIDE 600; 310; 30; 20 MG/100ML; MG/100ML; MG/100ML; MG/100ML
1000 INJECTION, SOLUTION INTRAVENOUS CONTINUOUS
Status: DISCONTINUED | OUTPATIENT
Start: 2022-09-12 | End: 2022-09-12 | Stop reason: HOSPADM

## 2022-09-12 RX ADMIN — SODIUM CHLORIDE, POTASSIUM CHLORIDE, SODIUM LACTATE AND CALCIUM CHLORIDE 1000 ML: 600; 310; 30; 20 INJECTION, SOLUTION INTRAVENOUS at 12:30

## 2022-09-12 RX ADMIN — LIDOCAINE HYDROCHLORIDE 60 MG: 20 INJECTION, SOLUTION INTRAVENOUS at 13:21

## 2022-09-12 RX ADMIN — PROPOFOL 50 MG: 10 INJECTION, EMULSION INTRAVENOUS at 13:21

## 2022-09-12 NOTE — ANESTHESIA POSTPROCEDURE EVALUATION
Patient: Karyna Santiago    Procedure Summary     Date: 09/12/22 Room / Location: [facility] ENDOSCOPY 3 / [facility] ENDOSCOPY    Anesthesia Start: 1319 Anesthesia Stop: 1326    Procedure: ESOPHAGOGASTRODUODENOSCOPY WITH BIOPSY (N/A Esophagus) Diagnosis:       Weight loss      (Weight loss [R63.4])    Surgeons: Abimael Menchaca MD Provider: Filipe Rizvi CRNA    Anesthesia Type: MAC ASA Status: 3          Anesthesia Type: MAC    Vitals  No vitals data found for the desired time range.          Post Anesthesia Care and Evaluation    Patient location during evaluation: bedside  Patient participation: complete - patient participated  Level of consciousness: awake  Pain score: 0  Pain management: adequate    Airway patency: patent  Anesthetic complications: No anesthetic complications  PONV Status: controlled  Cardiovascular status: acceptable and stable  Respiratory status: acceptable and room air  Hydration status: acceptable    Comments: Vital signs as noted in nursing documentation as per protocol

## 2022-09-12 NOTE — ANESTHESIA PREPROCEDURE EVALUATION
Anesthesia Evaluation     Patient summary reviewed and Nursing notes reviewed   no history of anesthetic complications:  NPO Solid Status: > 8 hours  NPO Liquid Status: > 8 hours           Airway   Mallampati: II  TM distance: >3 FB  Neck ROM: full  No difficulty expected  Dental    (+) poor dentition    Pulmonary - negative pulmonary ROS and normal exam   Cardiovascular - normal exam  Exercise tolerance: good (4-7 METS)    PT is on anticoagulation therapy    (+) hypertension well controlled,     ROS comment: ECHO 9/14/21-  · Left ventricular ejection fraction appears to be 61 - 65%. Left ventricular systolic function is normal.  · Left ventricular diastolic function is consistent with (grade Ia w/high LAP) impaired relaxation.  · Saline test results are positive for evidence of a PFO.  · Mild aortic valve regurgitation is present.  · Moderate tricuspid valve regurgitation is present.  · Estimated right ventricular systolic pressure from tricuspid regurgitation is mildly elevated (35-45 mmHg).         Neuro/Psych  (+) CVA, headaches,    GI/Hepatic/Renal/Endo - negative ROS     Musculoskeletal     Abdominal    Substance History - negative use     OB/GYN negative ob/gyn ROS         Other   arthritis, blood dyscrasia,                     Anesthesia Plan    ASA 3     MAC     (Risks and benefits discussed including risk of aspiration, recall and dental damage. All patient questions answered.    Will continue with plan of care.)  intravenous induction     Anesthetic plan, risks, benefits, and alternatives have been provided, discussed and informed consent has been obtained with: patient.  Pre-procedure education provided      CODE STATUS:

## 2022-09-12 NOTE — DISCHARGE INSTRUCTIONS
To assist you in voiding:  Drink plenty of fluids  Listen to running water while attempting to void.    If you are unable to urinate and you have an uncomfortable urge to void or it has been   6 hours since you were discharged, return to the Emergency Room     No pushing, pulling, tugging,  heavy lifting, or strenuous activity.  No major decision making, driving, or drinking alcoholic beverages for 24 hours. ( due to the medications you have  received)  Always use good hand hygiene/washing techniques.  NO driving while taking pain medications.

## 2022-09-13 LAB — REF LAB TEST METHOD: NORMAL

## 2022-09-22 ENCOUNTER — TELEPHONE (OUTPATIENT)
Dept: SURGERY | Facility: CLINIC | Age: 72
End: 2022-09-22

## 2022-09-22 NOTE — TELEPHONE ENCOUNTER
Patient hadn't showed call patient  No answer,left message to call office.Called patient cell no answer,no voice mail.  No show letter mailed to patient.

## 2022-11-07 ENCOUNTER — TELEPHONE (OUTPATIENT)
Dept: SURGERY | Facility: CLINIC | Age: 72
End: 2022-11-07
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
## 2022-11-10 NOTE — TELEPHONE ENCOUNTER
"I called pt again, there was no answer, message stated \"the voice mailbox is full and cannot accept messages at this time.\"  I will await pt's call.  "

## 2024-07-03 ENCOUNTER — TELEPHONE (OUTPATIENT)
Dept: UROLOGY | Facility: CLINIC | Age: 74
End: 2024-07-03

## 2024-07-03 NOTE — TELEPHONE ENCOUNTER
Hub staff attempted to follow warm transfer process and was unsuccessful     Caller: Karyna Santiago     Relationship to patient: SELF    Best call back number: [phone]     Patient is needing: PT HAS A REFERRAL IN SCHEDULING REVIEW AND WAS RETURNING ANTOINETTE'S CALL.     SHE IS AVAILABLE ANYTIME BEFORE 2PM, AFTER 2 SHE WILL BE OUT RUNNING ERRANDS AND MAY NOT HEAR THE PHONE.

## 2024-07-12 ENCOUNTER — OFFICE VISIT (OUTPATIENT)
Dept: UROLOGY | Facility: CLINIC | Age: 74
End: 2024-07-12
Payer: MEDICARE

## 2024-07-12 VITALS
HEART RATE: 61 BPM | WEIGHT: 131 LBS | BODY MASS INDEX: 24.11 KG/M2 | TEMPERATURE: 97.6 F | SYSTOLIC BLOOD PRESSURE: 124 MMHG | HEIGHT: 62 IN | OXYGEN SATURATION: 95 % | DIASTOLIC BLOOD PRESSURE: 70 MMHG

## 2024-07-12 DIAGNOSIS — N95.8 GENITOURINARY SYNDROME OF MENOPAUSE: ICD-10-CM

## 2024-07-12 DIAGNOSIS — N39.41 URINARY INCONTINENCE, URGE: ICD-10-CM

## 2024-07-12 DIAGNOSIS — R31.0 GROSS HEMATURIA: Primary | ICD-10-CM

## 2024-07-12 DIAGNOSIS — N39.3 SUI (STRESS URINARY INCONTINENCE, FEMALE): ICD-10-CM

## 2024-07-12 PROBLEM — Z71.6 TOBACCO ABUSE COUNSELING: Status: ACTIVE | Noted: 2021-10-18

## 2024-07-12 PROBLEM — K59.00 CONSTIPATION: Status: ACTIVE | Noted: 2023-02-13

## 2024-07-12 PROBLEM — J44.9 CHRONIC OBSTRUCTIVE PULMONARY DISEASE: Chronic | Status: ACTIVE | Noted: 2021-11-17

## 2024-07-12 PROBLEM — I70.90 ARTERIOSCLEROTIC VASCULAR DISEASE: Status: ACTIVE | Noted: 2021-07-15

## 2024-07-12 PROBLEM — D75.1 SECONDARY POLYCYTHEMIA: Status: ACTIVE | Noted: 2021-03-03

## 2024-07-12 PROBLEM — E83.42 HYPOMAGNESEMIA: Status: ACTIVE | Noted: 2024-04-24

## 2024-07-12 PROBLEM — E78.5 HYPERLIPIDEMIA: Status: ACTIVE | Noted: 2022-02-17

## 2024-07-12 PROBLEM — D35.02 ADENOMA OF LEFT ADRENAL GLAND: Status: ACTIVE | Noted: 2021-08-17

## 2024-07-12 PROBLEM — I25.10 CALCIFICATION OF CORONARY ARTERY: Status: ACTIVE | Noted: 2021-10-14

## 2024-07-12 PROBLEM — I73.9 INTERMITTENT CLAUDICATION: Chronic | Status: ACTIVE | Noted: 2021-10-14

## 2024-07-12 PROBLEM — I25.84 CALCIFICATION OF CORONARY ARTERY: Status: ACTIVE | Noted: 2021-10-14

## 2024-07-12 PROBLEM — M79.7 FIBROMYALGIA: Status: ACTIVE | Noted: 2021-03-03

## 2024-07-12 PROBLEM — Z87.74 S/P PATENT FORAMEN OVALE CLOSURE: Status: ACTIVE | Noted: 2023-08-10

## 2024-07-12 PROBLEM — M50.30 DDD (DEGENERATIVE DISC DISEASE), CERVICAL: Status: ACTIVE | Noted: 2023-04-17

## 2024-07-12 PROBLEM — Z86.73 HISTORY OF CVA (CEREBROVASCULAR ACCIDENT): Status: ACTIVE | Noted: 2022-12-12

## 2024-07-12 PROBLEM — N95.1 MENOPAUSAL SYMPTOM: Status: ACTIVE | Noted: 2021-03-03

## 2024-07-12 PROBLEM — G89.4 CHRONIC PAIN DISORDER: Status: ACTIVE | Noted: 2022-12-12

## 2024-07-12 PROBLEM — K21.9 GASTROESOPHAGEAL REFLUX DISEASE: Status: ACTIVE | Noted: 2021-10-18

## 2024-07-12 PROBLEM — I48.91 ATRIAL FIBRILLATION: Status: ACTIVE | Noted: 2024-07-12

## 2024-07-12 LAB
BILIRUB BLD-MCNC: NEGATIVE MG/DL
CLARITY, POC: CLEAR
COLOR UR: YELLOW
EXPIRATION DATE: ABNORMAL
GLUCOSE UR STRIP-MCNC: NEGATIVE MG/DL
KETONES UR QL: NEGATIVE
LEUKOCYTE EST, POC: NEGATIVE
Lab: ABNORMAL
NITRITE UR-MCNC: NEGATIVE MG/ML
PH UR: 7 [PH] (ref 5–8)
PROT UR STRIP-MCNC: NEGATIVE MG/DL
RBC # UR STRIP: ABNORMAL /UL
SP GR UR: 1.01 (ref 1–1.03)
UROBILINOGEN UR QL: ABNORMAL

## 2024-07-12 RX ORDER — TRAZODONE HYDROCHLORIDE 50 MG/1
50 TABLET ORAL DAILY
COMMUNITY
Start: 2024-05-30

## 2024-07-12 RX ORDER — NITROGLYCERIN 0.4 MG/1
0.4 TABLET SUBLINGUAL
COMMUNITY

## 2024-07-12 RX ORDER — ESTRADIOL 0.1 MG/G
1 CREAM VAGINAL 3 TIMES WEEKLY
Qty: 42.5 G | Refills: 12 | Status: SHIPPED | OUTPATIENT
Start: 2024-07-12

## 2024-07-12 RX ORDER — OXYBUTYNIN CHLORIDE 15 MG/1
1 TABLET, EXTENDED RELEASE ORAL DAILY
COMMUNITY
Start: 2024-04-20 | End: 2024-07-12

## 2024-07-12 RX ORDER — CLOPIDOGREL BISULFATE 75 MG/1
75 TABLET ORAL DAILY
COMMUNITY
Start: 2023-12-07 | End: 2024-12-06

## 2024-07-12 RX ORDER — TOLTERODINE 4 MG/1
4 CAPSULE, EXTENDED RELEASE ORAL DAILY
Qty: 30 CAPSULE | Refills: 5 | Status: SHIPPED | OUTPATIENT
Start: 2024-07-12

## 2024-07-12 RX ORDER — TRIAMTERENE AND HYDROCHLOROTHIAZIDE 37.5; 25 MG/1; MG/1
1 CAPSULE ORAL DAILY
COMMUNITY
Start: 2024-05-28

## 2024-07-12 NOTE — PROGRESS NOTES
Office Visit Hematuria Female      Patient Name: Karyna Santiago  : 1950   MRN: [id number]     Chief Complaint: Gross hematuria.   Chief Complaint   Patient presents with    Establish Care    Nephrolithiasis     Sometimes sees blood  No physical symptoms  Oxybutynin not really helping       Referring Provider: Raquel Toribio APRN    History of Present Illness: Karyna Santiago is a 74 y.o. female who presents today with history of gross hematuria.  Reports had CVA and diagnosed with atrial fibrillation around 3 years ago and was started on eliquis.  Eliquis was discontinued and plavix was started 2 year ago after discovering PFO.  She has noticed intermittent gross hematuria for the last year.  Patient was referred by NICHOLE Yañez.  Reports seeing blood as recent as today.      History of smoking?    yes, 57.5 total pack years  History of second-hand smoking exposure? no  History of chemotherapy?   no  History of radiation?    no  History of kidney or bladder stones?  no  History of frequent urinary tract infections? no    She was previously on detrol and was happy with this.  Was switched to oxybutynin and states she is having more incontinence.  She is going through 3 depends a week.  She wishes to resume detrol and stop oxybutynin.      She also reports leaking urine when she laughs, coughs, and sneezes at times.  Reports UUI is most bothersome.      Subjective      Review of System:   As noted in HPI.    Past Medical History:   Past Medical History:   Diagnosis Date    Anemia     Arthritis     Blood disease     Headache     History of transfusion     Hypertension     Stroke        Past Surgical History:   Past Surgical History:   Procedure Laterality Date    ENDOSCOPY N/A 2022    Procedure: ESOPHAGOGASTRODUODENOSCOPY WITH BIOPSY;  Surgeon: Abimael Menchaca MD;  Location: [facility] ENDOSCOPY;  Service: Gastroenterology;  Laterality: N/A;    GASTRIC BYPASS      HYSTERECTOMY   1986       Family History:   Family History   Problem Relation Age of Onset    Stroke Father      No family history of bladder or kidney cancer  No family history of kidney stones.     Social History:   Social History     Socioeconomic History    Marital status:    Tobacco Use    Smoking status: Every Day     Current packs/day: 1.00     Average packs/day: 1 pack/day for 57.5 years (57.5 ttl pk-yrs)     Types: Cigarettes     Start date: 1967     Passive exposure: Past    Smokeless tobacco: Never   Vaping Use    Vaping status: Never Used   Substance and Sexual Activity    Alcohol use: Never    Drug use: Defer    Sexual activity: Defer       Medications:     Current Outpatient Medications:     acetaminophen (TYLENOL) 325 MG tablet, Take 2 tablets by mouth Every 4 (Four) Hours As Needed for Mild Pain ., Disp: , Rfl:     aspirin 81 MG chewable tablet, Chew 1 tablet Daily., Disp: , Rfl:     atorvastatin (LIPITOR) 80 MG tablet, Take 1 tablet by mouth Every Night., Disp: 30 tablet, Rfl: 0    Breo Ellipta 100-25 MCG/INH inhaler, Inhale 1 puff Daily., Disp: , Rfl:     citalopram (CeleXA) 20 MG tablet, Take 1 tablet by mouth Daily., Disp: , Rfl:     clopidogrel (PLAVIX) 75 MG tablet, Take 1 tablet by mouth Daily., Disp: , Rfl:     Cyanocobalamin 1000 MCG/ML kit, INJECT 1 ML  ONCE EVERY MONTH INTRAMUSCULARLY, Disp: , Rfl:     furosemide (LASIX) 20 MG tablet, Take 1 tablet by mouth Daily., Disp: , Rfl:     nitroglycerin (NITROSTAT) 0.4 MG SL tablet, Place 1 tablet under the tongue Every 5 (Five) Minutes As Needed., Disp: , Rfl:     omeprazole (priLOSEC) 20 MG capsule, Take 1 capsule by mouth Daily., Disp: , Rfl:     sennosides-docusate (PERICOLACE) 8.6-50 MG per tablet, Take 1 tablet by mouth Daily., Disp: , Rfl:     traMADol (ULTRAM) 50 MG tablet, Take 2 tablets by mouth Every 6 (Six) Hours As Needed for Moderate Pain ., Disp: 6 tablet, Rfl: 0    traZODone (DESYREL) 50 MG tablet, Take 1 tablet by mouth Daily., Disp: ,  "Rfl:     triamterene-hydrochlorothiazide (DYAZIDE) 37.5-25 MG per capsule, Take 1 capsule by mouth Daily., Disp: , Rfl:     Ventolin  (90 Base) MCG/ACT inhaler, INHALE 1 TO 2 PUFFS IN MOUTH EVERY 4 TO 6 HOURS AS NEEDED, Disp: , Rfl:     estradiol (ESTRACE) 0.1 MG/GM vaginal cream, Insert 1 g into the vagina 3 (Three) Times a Week., Disp: 42.5 g, Rfl: 12    levOCARNitine 250 MG capsule, Take 250 mg by mouth 3 (Three) Times a Day., Disp: , Rfl:     tolterodine LA (DETROL LA) 4 MG 24 hr capsule, Take 1 capsule by mouth Daily., Disp: 30 capsule, Rfl: 5    Allergies:   Allergies   Allergen Reactions    Clindamycin/Lincomycin     Milk-Related Compounds GI Intolerance    Nsaids        Objective     Physical Exam:   Vital Signs:   Vitals:    07/12/24 1442   BP: 124/70   Pulse: 61   Temp: 97.6 °F (36.4 °C)   SpO2: 95%   Weight: 59.4 kg (131 lb)   Height: 157.5 cm (62.01\")     Body mass index is 23.95 kg/m².     Physical Exam  Vitals and nursing note reviewed.   Constitutional:       General: She is awake. She is not in acute distress.  Pulmonary:      Effort: Pulmonary effort is normal.   Neurological:      Mental Status: She is alert and oriented to person, place, and time. Mental status is at baseline.      Gait: Gait abnormal (slow and steady with use of cane).   Psychiatric:         Mood and Affect: Mood normal.         Behavior: Behavior is cooperative.          Labs  Brief Urine Lab Results  (Last result in the past 365 days)        Color   Clarity   Blood   Leuk Est   Nitrite   Protein   CREAT   Urine HCG        07/12/24 1506 Yellow   Clear   Trace   Negative   Negative   Negative                     Chemistry        Component Value Date/Time     09/17/2021 0713    K 3.8 09/17/2021 0713     (H) 09/17/2021 0713    CO2 25.0 09/17/2021 0713    BUN 14 09/17/2021 0713    CREATININE 0.61 09/17/2021 0713        Component Value Date/Time    CALCIUM 8.5 (L) 09/17/2021 0713    ALKPHOS 57 09/14/2021 0918    " "AST 13 09/14/2021 0918    ALT 7 09/14/2021 0918    BILITOT 0.6 09/14/2021 0918            Lab Results   Component Value Date    WBC 7.10 09/17/2021    HGB 14.8 10/07/2022    HCT 40.0 09/17/2021    MCV 93.0 09/17/2021     09/17/2021       No results found for: \"URINECX\"    Radiologic Studies  CT Abdomen Pelvis With Contrast    Result Date: 6/18/2024  Debris-distended gastric pouch. Constipation. No hydronephrosis or nephrolithiasis. Images reviewed, interpreted, and dictated by ANGEL Huizar MD      I have reviewed the above imaging and lab results.     Assessment / Plan      Assessment    Diagnoses and all orders for this visit:    1. Gross hematuria (Primary)    2. Genitourinary syndrome of menopause  -     estradiol (ESTRACE) 0.1 MG/GM vaginal cream; Insert 1 g into the vagina 3 (Three) Times a Week.  Dispense: 42.5 g; Refill: 12    3. Urinary incontinence, urge  -     tolterodine LA (DETROL LA) 4 MG 24 hr capsule; Take 1 capsule by mouth Daily.  Dispense: 30 capsule; Refill: 5  -     POC Urinalysis Dipstick, Automated    4. RODRIGO (stress urinary incontinence, female)       74 y.o. female who presents with gross hematuria. Reports had CVA and diagnosed with atrial fibrillation around 3 years ago and was started on eliquis.  Eliquis was discontinued and plavix was started 2 year ago after discovering PFO.  She has noticed intermittent gross hematuria for the last year.  Patient was referred by NICHOLE Yañez with normal urine cytology.  CT abdomen/pelvis with and without revealed unremarkable bladder, no hydro, no nephrolithiasis-reviewed with Dr. John.  Reports seeing blood as recent as today.  Reports history of total hysterectomy.  UA today shows trace blood, no nitrites or leukocytes.     Risk factors include age, history of gross hematuria, 57.5 total pack years.    In order to complete the hematuria work up we need to perform a flexible cystoscopy with Dr. John, discussed procedure and " patient is agreeable to proceed.    Patient noted to have mixed UI with UUI most bothersome.  She was previously on detrol and was happy with this.  Was switched to oxybutynin and states she is having more incontinence episodes.  She is going through 3 depends a week.  She wishes to resume detrol and stop oxybutynin.  Will start on estradiol 1 gram 3 times weekly at bedtime.  Aggressively treat constipation.    Plan  We discussed the indications for diagnostic flexible cystoscopy to be performed at the next clinic visit with Dr. John  Estradiol 1 gram 3 times weekly at bedtime  Stop oxybutynin  Restart detrol 4 mg daily  Aggressively treat constipation  Follow up with Vasquez for pelvic and RODRIGO evaluation after cystoscopy.        Follow Up:   Return for with Dr. John for cystoscopy, f/u with Vsaquez after this for RODRIGO evaluation with pelvic exam .      NICHOLE Mendoza  [facility]

## 2024-09-19 ENCOUNTER — PROCEDURE VISIT (OUTPATIENT)
Dept: UROLOGY | Facility: CLINIC | Age: 74
End: 2024-09-19
Payer: MEDICARE

## 2024-09-19 VITALS
HEIGHT: 62 IN | BODY MASS INDEX: 24.11 KG/M2 | OXYGEN SATURATION: 95 % | DIASTOLIC BLOOD PRESSURE: 70 MMHG | TEMPERATURE: 96.2 F | HEART RATE: 64 BPM | SYSTOLIC BLOOD PRESSURE: 124 MMHG | WEIGHT: 131 LBS

## 2024-09-19 DIAGNOSIS — R31.0 GROSS HEMATURIA: Primary | ICD-10-CM

## 2024-09-19 RX ORDER — OFLOXACIN 3 MG/ML
SOLUTION/ DROPS OPHTHALMIC
COMMUNITY
Start: 2024-09-04

## 2024-09-19 RX ORDER — CARVEDILOL 12.5 MG/1
12.5 TABLET ORAL
COMMUNITY
Start: 2024-08-19 | End: 2025-08-14

## 2024-09-19 RX ORDER — PREDNISOLONE ACETATE 10 MG/ML
SUSPENSION/ DROPS OPHTHALMIC
COMMUNITY
Start: 2024-09-04

## 2024-09-19 RX ORDER — KETOROLAC TROMETHAMINE 5 MG/ML
SOLUTION OPHTHALMIC
COMMUNITY
Start: 2024-09-04

## 2024-09-19 RX ORDER — ERYTHROMYCIN 5 MG/G
OINTMENT OPHTHALMIC
COMMUNITY
Start: 2024-07-30

## 2024-11-14 NOTE — PLAN OF CARE
Goal Outcome Evaluation:  Plan of Care Reviewed With: patient     SLP evaluation completed. Will address dysphagia and communication in tx. Please see note for further details and recommendations.              no

## 2025-01-09 DIAGNOSIS — N39.41 URINARY INCONTINENCE, URGE: ICD-10-CM

## 2025-01-09 RX ORDER — TOLTERODINE 4 MG/1
4 CAPSULE, EXTENDED RELEASE ORAL DAILY
Qty: 90 CAPSULE | Refills: 0 | Status: SHIPPED | OUTPATIENT
Start: 2025-01-09

## 2025-04-06 DIAGNOSIS — N39.41 URINARY INCONTINENCE, URGE: ICD-10-CM

## 2025-04-08 RX ORDER — TOLTERODINE 4 MG/1
4 CAPSULE, EXTENDED RELEASE ORAL DAILY
Qty: 90 CAPSULE | Refills: 0 | OUTPATIENT
Start: 2025-04-08

## 2025-04-28 DIAGNOSIS — N39.41 URINARY INCONTINENCE, URGE: ICD-10-CM

## 2025-05-02 RX ORDER — TOLTERODINE 4 MG/1
4 CAPSULE, EXTENDED RELEASE ORAL DAILY
Qty: 90 CAPSULE | Refills: 0 | OUTPATIENT
Start: 2025-05-02

## 2025-05-03 ENCOUNTER — PATIENT MESSAGE (OUTPATIENT)
Dept: UROLOGY | Facility: CLINIC | Age: 75
End: 2025-05-03
Payer: MEDICARE

## 2025-05-03 DIAGNOSIS — N39.41 URINARY INCONTINENCE, URGE: ICD-10-CM

## 2025-05-05 ENCOUNTER — TELEPHONE (OUTPATIENT)
Dept: UROLOGY | Facility: CLINIC | Age: 75
End: 2025-05-05

## 2025-05-05 NOTE — TELEPHONE ENCOUNTER
Hub staff attempted to follow warm transfer process and was unsuccessful     Caller: SHIRLEY SAL    Relationship to patient: SELF    Best call back number: [phone] (home)      Patient is needing: PT STATES SHE IS RETURNING A CALL TO [place]. PLEASE CALL PT BACK. THANK YOU.

## 2025-05-06 RX ORDER — TOLTERODINE 4 MG/1
4 CAPSULE, EXTENDED RELEASE ORAL DAILY
Qty: 30 CAPSULE | Refills: 0 | Status: SHIPPED | OUTPATIENT
Start: 2025-05-06

## 2025-05-27 ENCOUNTER — OFFICE VISIT (OUTPATIENT)
Dept: UROLOGY | Facility: CLINIC | Age: 75
End: 2025-05-27
Payer: MEDICARE

## 2025-05-27 VITALS
HEART RATE: 80 BPM | DIASTOLIC BLOOD PRESSURE: 60 MMHG | OXYGEN SATURATION: 91 % | TEMPERATURE: 97.4 F | WEIGHT: 131 LBS | SYSTOLIC BLOOD PRESSURE: 124 MMHG | HEIGHT: 62 IN | BODY MASS INDEX: 24.11 KG/M2

## 2025-05-27 DIAGNOSIS — N39.41 URINARY INCONTINENCE, URGE: Primary | ICD-10-CM

## 2025-05-27 PROCEDURE — 51798 US URINE CAPACITY MEASURE: CPT | Performed by: NURSE PRACTITIONER

## 2025-05-27 PROCEDURE — 3074F SYST BP LT 130 MM HG: CPT | Performed by: NURSE PRACTITIONER

## 2025-05-27 PROCEDURE — 99214 OFFICE O/P EST MOD 30 MIN: CPT | Performed by: NURSE PRACTITIONER

## 2025-05-27 PROCEDURE — 1160F RVW MEDS BY RX/DR IN RCRD: CPT | Performed by: NURSE PRACTITIONER

## 2025-05-27 PROCEDURE — 3078F DIAST BP <80 MM HG: CPT | Performed by: NURSE PRACTITIONER

## 2025-05-27 PROCEDURE — 1159F MED LIST DOCD IN RCRD: CPT | Performed by: NURSE PRACTITIONER

## 2025-05-27 RX ORDER — VIBEGRON 75 MG/1
75 TABLET, FILM COATED ORAL DAILY
Qty: 30 TABLET | Refills: 11 | Status: SHIPPED | OUTPATIENT
Start: 2025-05-27

## 2025-05-27 NOTE — PROGRESS NOTES
Office Visit     Patient Name: Karyna Santiago  : 1950   MRN: [id number]     Patient or patient representative verbalized consent for the use of Ambient Listening during the visit with  NICHOLE Laura for chart documentation. 2025  16:39 EDT    Chief Complaint:   Chief Complaint   Patient presents with    Follow-up     Medication refill  Questions about Botox     Referring Provider: No ref. provider found    Primary Care Provider: Mason Miranda MD     History of Present Illness  The patient presents for medication refills.    Frequent urination  - She reports frequent urination every couple of hours.  - She has not tried Myrbetriq or Gemtesa.  - Previous discontinued oxybutynin not effective and patient had severe side effects.  - Currently on Detrol (tolterodine) and prefers to avoid oxybutynin.    Post-stroke memory issues  - She has short-term memory issues.  - She struggles to recall names and requires memory triggers.      Subjective   Review of System:   As noted in HPI.    Past Medical History:   Diagnosis Date    Anemia     Arthritis     Blood disease     Clotting disorder     Coronary artery disease ?    CHF meds and control    Headache     History of transfusion     Hypertension     BP controlled with RX    Stroke     Urinary incontinence     Rx helps     Past Surgical History:   Procedure Laterality Date    APPENDECTOMY  1986    CATARACT EXTRACTION Left 2024    CYSTOSCOPY  ?    ENDOSCOPY N/A 2022    Procedure: ESOPHAGOGASTRODUODENOSCOPY WITH BIOPSY;  Surgeon: Abimael Menchaca MD;  Location: [facility];  Service: Gastroenterology;  Laterality: N/A;    GASTRIC BYPASS      HYSTERECTOMY  1986    OOPHORECTOMY       Family History   Problem Relation Age of Onset    Stroke Father     Hypertension Father     Heart disease Paternal Aunt      Social History     Socioeconomic History    Marital status:    Tobacco Use    Smoking status:  Every Day     Current packs/day: 1.00     Average packs/day: 1 pack/day for 58.4 years (58.4 ttl pk-yrs)     Types: Cigarettes     Start date: 1/1/1967     Passive exposure: Past    Smokeless tobacco: Never   Vaping Use    Vaping status: Never Used   Substance and Sexual Activity    Alcohol use: Not Currently    Drug use: Never    Sexual activity: Not Currently     Birth control/protection: Hysterectomy       Current Outpatient Medications:     acetaminophen (TYLENOL) 325 MG tablet, Take 2 tablets by mouth Every 4 (Four) Hours As Needed for Mild Pain ., Disp: , Rfl:     aspirin 81 MG chewable tablet, Chew 1 tablet Daily., Disp: , Rfl:     atorvastatin (LIPITOR) 80 MG tablet, Take 1 tablet by mouth Every Night., Disp: 30 tablet, Rfl: 0    Breo Ellipta 100-25 MCG/INH inhaler, Inhale 1 puff Daily., Disp: , Rfl:     carvedilol (COREG) 12.5 MG tablet, Take 1 tablet by mouth., Disp: , Rfl:     citalopram (CeleXA) 20 MG tablet, Take 1 tablet by mouth Daily., Disp: , Rfl:     Cyanocobalamin 1000 MCG/ML kit, INJECT 1 ML  ONCE EVERY MONTH INTRAMUSCULARLY, Disp: , Rfl:     estradiol (ESTRACE) 0.1 MG/GM vaginal cream, Insert 1 g into the vagina 3 (Three) Times a Week., Disp: 42.5 g, Rfl: 12    furosemide (LASIX) 20 MG tablet, Take 1 tablet by mouth Daily., Disp: , Rfl:     ketorolac (ACULAR) 0.5 % ophthalmic solution, INSTILL 1 DROP INTO LEFT EYE 4 TIMES DAILY, Disp: , Rfl:     levOCARNitine 250 MG capsule, Take 250 mg by mouth 3 (Three) Times a Day., Disp: , Rfl:     nitroglycerin (NITROSTAT) 0.4 MG SL tablet, Place 1 tablet under the tongue Every 5 (Five) Minutes As Needed., Disp: , Rfl:     omeprazole (priLOSEC) 20 MG capsule, Take 1 capsule by mouth Daily., Disp: , Rfl:     sennosides-docusate (PERICOLACE) 8.6-50 MG per tablet, Take 1 tablet by mouth Daily., Disp: , Rfl:     traMADol (ULTRAM) 50 MG tablet, Take 2 tablets by mouth Every 6 (Six) Hours As Needed for Moderate Pain ., Disp: 6 tablet, Rfl: 0    traZODone  "(DESYREL) 50 MG tablet, Take 1 tablet by mouth Daily., Disp: , Rfl:     triamterene-hydrochlorothiazide (DYAZIDE) 37.5-25 MG per capsule, Take 1 capsule by mouth Daily., Disp: , Rfl:     Ventolin  (90 Base) MCG/ACT inhaler, INHALE 1 TO 2 PUFFS IN MOUTH EVERY 4 TO 6 HOURS AS NEEDED, Disp: , Rfl:     Vibegron (Gemtesa) 75 MG tablet, Take 1 tablet by mouth Daily., Disp: 30 tablet, Rfl: 11    Allergies   Allergen Reactions    Clindamycin/Lincomycin     Milk-Related Compounds GI Intolerance    Nsaids      Objective   Visit Vitals  /60   Pulse 80   Temp 97.4 °F (36.3 °C)   Ht 157.5 cm (62.01\")   Wt 59.4 kg (131 lb)   SpO2 91%   BMI 23.95 kg/m²        Body mass index is 23.95 kg/m².     Physical Exam  Vitals and nursing note reviewed.   Constitutional:       General: She is not in acute distress.     Appearance: Normal appearance. She is not ill-appearing.   Pulmonary:      Effort: Pulmonary effort is normal. No respiratory distress.   Skin:     General: Skin is warm and dry.   Neurological:      General: No focal deficit present.      Mental Status: She is alert and oriented to person, place, and time.      Gait: Gait abnormal (Unsteady uses a walker).   Psychiatric:         Mood and Affect: Mood normal.         Behavior: Behavior normal.          Labs  Lab Results   Component Value Date    COLORU Yellow 07/12/2024    CLARITYU Clear 07/12/2024    SPECGRAV 1.015 07/12/2024    PHUR 7.0 07/12/2024    LEUKOCYTESUR Negative 07/12/2024    NITRITE Negative 07/12/2024    PROTEINPOCUA Negative 07/12/2024    GLUCOSEUR Negative 07/12/2024    KETONESU Negative 07/12/2024    UROBILINOGEN 1 E.U./dL (A) 07/12/2024    BILIRUBINUR Negative 07/12/2024    RBCUR Trace (A) 07/12/2024      Lab Results   Component Value Date    WBCUA 0-2 09/14/2021    RBCUA 0-2 09/14/2021    BACTERIA 4+ (A) 09/14/2021    HYALCASTU None Seen 09/14/2021    SQUAMEPIUA 0-2 09/14/2021        Lab Results   Component Value Date    WBC 7.10 09/17/2021    " "HGB 14.8 10/07/2022    HCT 40.0 09/17/2021    MCV 93.0 09/17/2021     09/17/2021     Lab Results   Component Value Date    GLUCOSE 75 09/17/2021    CALCIUM 8.5 (L) 09/17/2021     09/17/2021    K 3.8 09/17/2021    CO2 25.0 09/17/2021     (H) 09/17/2021    BUN 14 09/17/2021    BUN 13 09/15/2021    CREATININE 0.61 09/17/2021    CREATININE 0.61 09/15/2021    BCR 23.0 09/17/2021    ANIONGAP 7.0 09/17/2021    ALT 7 09/14/2021    AST 13 09/14/2021     Lab Results   Component Value Date    HGBA1C 5.40 09/14/2021     No results found for: \"URICACIDSTN\", \"[id number]\", \"OBGJ7ISVDW\", \"LABMAGN\"  No results found for: \"CMNH14WV\", \"CAION\", \"PTH\", \"URICACID\"  No results found for: \"CYSTINE\", \"URINEVOLUM\", \"CALCIUMUR\", \"OXALATEU\", \"CITRATEUR\", \"LABPH\", \"URICUR\", \"NAUR\", \"KUR\", \"MAGNESIUMUR\", \"PHOSUR\", \"AMMONIUMUR\", \"CLUR\", \"OIOITLZNF33Q\", \"UREAUR\", \"LABCREAUR\"    No results found for: \"ATOPOBIUMV\", \"BVAB2\", \"MEGASPHAER\", \"CALBICANSN\", \"CGLABRATAN\", \"CPARAPSILOS\", \"CLUSITANIAE\", \"CKRUSEI\", \"TRICHVAG\", \"CHLAMNAA\", \"NGONORRHON\", \"UREAPLASMA\", \"MYCOPLASMAG\"    Lab Results   Component Value Date    TSH 1.523 04/15/2025    FREET4 1.27 04/15/2025    XLMRNTJM90 824 11/25/2024         I have reviewed the above labs.     PVR  Post-void residual performed with ultrasound by staff and interpreted by me - Sophia mL    Assessment / Plan      Diagnoses and all orders for this visit:    1. Urinary incontinence, urge (Primary)  -     Vibegron (Gemtesa) 75 MG tablet; Take 1 tablet by mouth Daily.  Dispense: 30 tablet; Refill: 11         Assessment & Plan  1. 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    Vasquez Tamayo, MSN, APRN, FNP-C  [facility]

## 2025-06-02 DIAGNOSIS — N39.41 URINARY INCONTINENCE, URGE: ICD-10-CM

## 2025-06-02 RX ORDER — TOLTERODINE 4 MG/1
4 CAPSULE, EXTENDED RELEASE ORAL DAILY
Qty: 30 CAPSULE | Refills: 0 | OUTPATIENT
Start: 2025-06-02

## 2025-08-28 ENCOUNTER — OFFICE VISIT (OUTPATIENT)
Dept: UROLOGY | Facility: CLINIC | Age: 75
End: 2025-08-28
Payer: MEDICARE

## 2025-08-28 VITALS
WEIGHT: 130.95 LBS | HEART RATE: 74 BPM | RESPIRATION RATE: 18 BRPM | OXYGEN SATURATION: 93 % | SYSTOLIC BLOOD PRESSURE: 118 MMHG | DIASTOLIC BLOOD PRESSURE: 80 MMHG | BODY MASS INDEX: 24.1 KG/M2 | HEIGHT: 62 IN | TEMPERATURE: 98.1 F

## 2025-08-28 DIAGNOSIS — N39.41 URINARY INCONTINENCE, URGE: Primary | ICD-10-CM

## 2025-08-28 LAB
BILIRUB BLD-MCNC: NEGATIVE MG/DL
CLARITY, POC: CLEAR
COLOR UR: YELLOW
EXPIRATION DATE: NORMAL
GLUCOSE UR STRIP-MCNC: NEGATIVE MG/DL
KETONES UR QL: NEGATIVE
LEUKOCYTE EST, POC: NEGATIVE
Lab: NORMAL
NITRITE UR-MCNC: NEGATIVE MG/ML
PH UR: 7 [PH] (ref 5–8)
PROT UR STRIP-MCNC: NEGATIVE MG/DL
RBC # UR STRIP: NEGATIVE /UL
SP GR UR: 1.02 (ref 1–1.03)
UROBILINOGEN UR QL: NORMAL

## 2025-08-28 RX ORDER — TOLTERODINE 4 MG/1
4 CAPSULE, EXTENDED RELEASE ORAL DAILY
COMMUNITY
Start: 2025-08-25 | End: 2025-08-28 | Stop reason: SDUPTHER

## 2025-08-28 RX ORDER — TOLTERODINE 4 MG/1
4 CAPSULE, EXTENDED RELEASE ORAL DAILY
Qty: 90 CAPSULE | Refills: 3 | Status: SHIPPED | OUTPATIENT
Start: 2025-08-28

## (undated) DEVICE — GLV SURG SENSICARE W/ALOE PF LF 8.5 STRL

## (undated) DEVICE — LUBE JELLY PK/2.75GM STRL BX/144

## (undated) DEVICE — CONMED SCOPE SAVER BITE BLOCK, 20X27 MM: Brand: SCOPE SAVER

## (undated) DEVICE — ENDOSCOPY PORT CONNECTOR FOR OLYMPUS® SCOPES: Brand: ERBE

## (undated) DEVICE — SUCTION CANISTER, 1500CC, RIGID: Brand: DEROYAL